# Patient Record
Sex: MALE | Race: WHITE | NOT HISPANIC OR LATINO | Employment: FULL TIME | URBAN - METROPOLITAN AREA
[De-identification: names, ages, dates, MRNs, and addresses within clinical notes are randomized per-mention and may not be internally consistent; named-entity substitution may affect disease eponyms.]

---

## 2017-03-21 ENCOUNTER — HOSPITAL ENCOUNTER (EMERGENCY)
Facility: MEDICAL CENTER | Age: 37
End: 2017-03-21
Attending: EMERGENCY MEDICINE
Payer: COMMERCIAL

## 2017-03-21 VITALS
RESPIRATION RATE: 18 BRPM | DIASTOLIC BLOOD PRESSURE: 77 MMHG | HEIGHT: 73 IN | HEART RATE: 82 BPM | OXYGEN SATURATION: 97 % | TEMPERATURE: 98.7 F | WEIGHT: 253.09 LBS | BODY MASS INDEX: 33.54 KG/M2 | SYSTOLIC BLOOD PRESSURE: 121 MMHG

## 2017-03-21 DIAGNOSIS — M79.641 RIGHT HAND PAIN: ICD-10-CM

## 2017-03-21 DIAGNOSIS — M10.9 ACUTE GOUTY ARTHRITIS: ICD-10-CM

## 2017-03-21 PROCEDURE — 99284 EMERGENCY DEPT VISIT MOD MDM: CPT

## 2017-03-21 PROCEDURE — 700102 HCHG RX REV CODE 250 W/ 637 OVERRIDE(OP): Performed by: EMERGENCY MEDICINE

## 2017-03-21 PROCEDURE — A9270 NON-COVERED ITEM OR SERVICE: HCPCS | Performed by: EMERGENCY MEDICINE

## 2017-03-21 RX ORDER — COLCHICINE 0.6 MG/1
0.6 TABLET ORAL ONCE
Status: COMPLETED | OUTPATIENT
Start: 2017-03-21 | End: 2017-03-21

## 2017-03-21 RX ORDER — METHYLPREDNISOLONE 4 MG/1
TABLET ORAL
Qty: 1 KIT | Refills: 0 | Status: SHIPPED | OUTPATIENT
Start: 2017-03-21 | End: 2017-08-22

## 2017-03-21 RX ORDER — COLCHICINE 0.6 MG/1
1.2 TABLET ORAL ONCE
Status: COMPLETED | OUTPATIENT
Start: 2017-03-21 | End: 2017-03-21

## 2017-03-21 RX ADMIN — COLCHICINE 0.6 MG: 0.6 TABLET, FILM COATED ORAL at 23:19

## 2017-03-21 RX ADMIN — COLCHICINE 1.2 MG: 0.6 TABLET, FILM COATED ORAL at 22:25

## 2017-03-21 ASSESSMENT — PAIN SCALES - GENERAL: PAINLEVEL_OUTOF10: 3

## 2017-03-21 NOTE — ED AVS SNAPSHOT
Home Care Instructions                                                                                                                Chase Hughes   MRN: [id number]    Department:  [facility], Emergency Dept   Date of Visit:  3/21/2017            [facility], Emergency Dept    [address]    [place] [address]    Phone:  [phone]      You were seen by     Amber Burr D.O.      Your Diagnosis Was     Right hand pain     M79.641       These are the medications you received during your hospitalization from 03/21/2017 2127 to 03/21/2017 2320     Date/Time Order Dose Route Action    03/21/2017 2225 colchicine (COLCRYS) tablet 1.2 mg 1.2 mg Oral Given    03/21/2017 2319 colchicine (COLCRYS) tablet 0.6 mg 0.6 mg Oral Given      Follow-up Information     1. Follow up with Anita Isabel M.D. In 1 day.    Specialty:  Family Medicine    Contact information    [address]  [address]  [facility] [address] [phone]          2. Follow up with Abbi Gan M.D. In 1 day.    Specialty:  Family Medicine    Contact information    [address]  [place] [address] [phone]          3. Follow up with [facility] In 1 day.    Contact information    [facility] [address] [phone]          4. Follow up with [facility] In 1 day.    Contact information    [address] [address] [phone]      Medication Information     Review all of your home medications and newly ordered medications with your primary doctor and/or pharmacist as soon as possible. Follow medication instructions as directed by your doctor and/or pharmacist.     Please keep your complete medication list with you and share with your physician. Update the information when medications are discontinued, doses are changed, or new medications (including over-the-counter products) are added; and carry medication information at all times in the event of  emergency situations.               Medication List      START taking these medications        Instructions    Morning Afternoon Evening Bedtime    MethylPREDNISolone 4 MG Tbpk   Commonly known as:  MEDROL DOSEPAK        Use as directed                             Where to Get Your Medications      You can get these medications from any pharmacy     Bring a paper prescription for each of these medications    - MethylPREDNISolone 4 MG Tbpk              Discharge Instructions       Follow-up with primary care 1-2 days for reevaluation, medication management and close blood pressure monitoring.    Medrol Dosepak as directed for 6 days.  Take naproxen twice daily as needed for pain or swelling.  Rest, ice, elevation as needed for discomfort.  Weightbearing and activity as tolerated.    Return to the emergency department in 24 hours for any persistent pain, swelling or redness.  Return to the emergency department immediately for increased pain, swelling, redness, fever or other new concerns.    Gout  Gout is an inflammatory arthritis caused by a buildup of uric acid crystals in the joints. Uric acid is a chemical that is normally present in the blood. When the level of uric acid in the blood is too high it can form crystals that deposit in your joints and tissues. This causes joint redness, soreness, and swelling (inflammation). Repeat attacks are common. Over time, uric acid crystals can form into masses (tophi) near a joint, destroying bone and causing disfigurement. Gout is treatable and often preventable.  CAUSES   The disease begins with elevated levels of uric acid in the blood. Uric acid is produced by your body when it breaks down a naturally found substance called purines. Certain foods you eat, such as meats and fish, contain high amounts of purines. Causes of an elevated uric acid level include:  · Being passed down from parent to child (heredity).  · Diseases that cause increased uric acid production (such as  obesity, psoriasis, and certain cancers).  · Excessive alcohol use.  · Diet, especially diets rich in meat and seafood.  · Medicines, including certain cancer-fighting medicines (chemotherapy), water pills (diuretics), and aspirin.  · Chronic kidney disease. The kidneys are no longer able to remove uric acid well.  · Problems with metabolism.  Conditions strongly associated with gout include:  · Obesity.  · High blood pressure.  · High cholesterol.  · Diabetes.  Not everyone with elevated uric acid levels gets gout. It is not understood why some people get gout and others do not. Surgery, joint injury, and eating too much of certain foods are some of the factors that can lead to gout attacks.  SYMPTOMS   · An attack of gout comes on quickly. It causes intense pain with redness, swelling, and warmth in a joint.  · Fever can occur.  · Often, only one joint is involved. Certain joints are more commonly involved:  ¨ Base of the big toe.  ¨ Knee.  ¨ Ankle.  ¨ Wrist.  ¨ Finger.  Without treatment, an attack usually goes away in a few days to weeks. Between attacks, you usually will not have symptoms, which is different from many other forms of arthritis.  DIAGNOSIS   Your caregiver will suspect gout based on your symptoms and exam. In some cases, tests may be recommended. The tests may include:  · Blood tests.  · Urine tests.  · X-rays.  · Joint fluid exam. This exam requires a needle to remove fluid from the joint (arthrocentesis). Using a microscope, gout is confirmed when uric acid crystals are seen in the joint fluid.  TREATMENT   There are two phases to gout treatment: treating the sudden onset (acute) attack and preventing attacks (prophylaxis).  · Treatment of an Acute Attack.  ¨ Medicines are used. These include anti-inflammatory medicines or steroid medicines.  ¨ An injection of steroid medicine into the affected joint is sometimes necessary.  ¨ The painful joint is rested. Movement can worsen the  arthritis.  ¨ You may use warm or cold treatments on painful joints, depending which works best for you.  · Treatment to Prevent Attacks.  ¨ If you suffer from frequent gout attacks, your caregiver may advise preventive medicine. These medicines are started after the acute attack subsides. These medicines either help your kidneys eliminate uric acid from your body or decrease your uric acid production. You may need to stay on these medicines for a very long time.  ¨ The early phase of treatment with preventive medicine can be associated with an increase in acute gout attacks. For this reason, during the first few months of treatment, your caregiver may also advise you to take medicines usually used for acute gout treatment. Be sure you understand your caregiver's directions. Your caregiver may make several adjustments to your medicine dose before these medicines are effective.  ¨ Discuss dietary treatment with your caregiver or dietitian. Alcohol and drinks high in sugar and fructose and foods such as meat, poultry, and seafood can increase uric acid levels. Your caregiver or dietitian can advise you on drinks and foods that should be limited.  HOME CARE INSTRUCTIONS   · Do not take aspirin to relieve pain. This raises uric acid levels.  · Only take over-the-counter or prescription medicines for pain, discomfort, or fever as directed by your caregiver.  · Rest the joint as much as possible. When in bed, keep sheets and blankets off painful areas.  · Keep the affected joint raised (elevated).  · Apply warm or cold treatments to painful joints. Use of warm or cold treatments depends on which works best for you.  · Use crutches if the painful joint is in your leg.  · Drink enough fluids to keep your urine clear or pale yellow. This helps your body get rid of uric acid. Limit alcohol, sugary drinks, and fructose drinks.  · Follow your dietary instructions. Pay careful attention to the amount of protein you eat. Your  daily diet should emphasize fruits, vegetables, whole grains, and fat-free or low-fat milk products. Discuss the use of coffee, vitamin C, and cherries with your caregiver or dietitian. These may be helpful in lowering uric acid levels.  · Maintain a healthy body weight.  SEEK MEDICAL CARE IF:   · You develop diarrhea, vomiting, or any side effects from medicines.  · You do not feel better in 24 hours, or you are getting worse.  SEEK IMMEDIATE MEDICAL CARE IF:   · Your joint becomes suddenly more tender, and you have chills or a fever.  MAKE SURE YOU:   · Understand these instructions.  · Will watch your condition.  · Will get help right away if you are not doing well or get worse.     This information is not intended to replace advice given to you by your health care provider. Make sure you discuss any questions you have with your health care provider.     Document Released: 12/15/2001 Document Revised: 01/08/2016 Document Reviewed: 07/31/2013  [employer] Interactive Patient Education ©2016 [employer]            Patient Information     Patient Information    Following emergency treatment: all patient requiring follow-up care must return either to a private physician or a clinic if your condition worsens before you are able to obtain further medical attention, please return to the emergency room.     Billing Information    At [facility], we work to make the billing process streamlined for our patients.  Our Representatives are here to answer any questions you may have regarding your hospital bill.  If you have insurance coverage and have supplied your insurance information to us, we will submit a claim to your insurer on your behalf.  Should you have any questions regarding your bill, we can be reached online or by phone as follows:  Online: You are able pay your bills online or live chat with our representatives about any billing questions you may have. We are here to help Monday - Friday from 8:00am to 7:30pm  and 9:00am - 12:00pm on Saturdays.  Please visit [email or web address]  for more information.   Phone:  [phone] or [phone]    Please note that your emergency physician, surgeon, pathologist, radiologist, anesthesiologist, and other specialists are not employed by [facility] and will therefore bill separately for their services.  Please contact them directly for any questions concerning their bills at the numbers below:     Emergency Physician Services:  [phone]  [facility]:  [phone]  [facility]:  [phone]  [facility]:  [phone]    1. Your final bill may vary from the amount quoted upon discharge if all procedures are not complete at that time, or if your doctor has additional procedures of which we are not aware. You will receive an additional bill if you return to the Emergency Department at [facility] for suture removal regardless of the facility of which the sutures were placed.     2. Please arrange for settlement of this account at the emergency registration.    3. All self-pay accounts are due in full at the time of treatment.  If you are unable to meet this obligation then payment is expected within 4-5 days.     4. If you have had radiology studies (CT, X-ray, Ultrasound, MRI), you have received a preliminary result during your emergency department visit. Please contact the radiology department [phone] to receive a copy of your final result. Please discuss the Final result with your primary physician or with the follow up physician provided.     Crisis Hotline:  [place] Crisis Hotline:  3-642-HLVSHGL or 1-285.269.6822  [place] Crisis Hotline:    1-207.828.1115 or 960-857-1039         ED Discharge Follow Up Questions    1. In order to provide you with very good care, we would like to follow up with a phone call in the next few days.  May we have your permission to contact you?     YES /   NO    2. What is the best phone number to call you? (       )_____-__________    3. What is the best time to call you?      Morning  /  Afternoon  /  Evening                   Patient Signature:  ____________________________________________________________    Date:  ____________________________________________________________

## 2017-03-21 NOTE — ED AVS SNAPSHOT
[employer] Access Code: [email or web address]  Expires: 4/20/2017 11:20 PM    Your email address is not on file at [facility].  Email Addresses are required for you to sign up for [employer], please contact [phone] to verify your personal information and to provide your email address prior to attempting to register for [employer].    Chase Hughes  [address] [place] [address]    [employer]  A secure, online tool to manage your health information     [facility]’s [employer]® is a secure, online tool that connects you to your personalized health information from the privacy of your home -- day or night - making it very easy for you to manage your healthcare. Once the activation process is completed, you can even access your medical information using the [employer] nahum, which is available for free in the Apple Nahum store or Google Play store.     To learn more about [employer], visit [email or web address] Systems    There are two levels of access available (as shown below):   My Chart Features  [facility] Primary Care Doctor [facility]  Specialists [facility]  Urgent  Care Non-[facility] Primary Care Doctor   Email your healthcare team securely and privately 24/7 X X X    Manage appointments: schedule your next appointment; view details of past/upcoming appointments X      Request prescription refills. X      View recent personal medical records, including lab and immunizations X X X X   View health record, including health history, allergies, medications X X X X   Read reports about your outpatient visits, procedures, consult and ER notes X X X X   See your discharge summary, which is a recap of your hospital and/or ER visit that includes your diagnosis, lab results, and care plan X X  X     How to register for [employer]:  Once your e-mail address has been verified, follow the following steps to sign up for [employer].     1. Go to  [email or web address]  2. Click on the Sign Up Now box, which takes you to the New Member Sign Up page. You will need  to provide the following information:  a. Enter your [employer] Access Code exactly as it appears at the top of this page. (You will not need to use this code after you’ve completed the sign-up process. If you do not sign up before the expiration date, you must request a new code.)   b. Enter your date of birth.   c. Enter your home email address.   d. Click Submit, and follow the next screen’s instructions.  3. Create a Symptify ID. This will be your [employer] login ID and cannot be changed, so think of one that is secure and easy to remember.  4. Create a [employer] password. You can change your password at any time.  5. Enter your Password Reset Question and Answer. This can be used at a later time if you forget your password.   6. Enter your e-mail address. This allows you to receive e-mail notifications when new information is available in [employer].  7. Click Sign Up. You can now view your health information.    For assistance activating your [employer] account, call [phone]

## 2017-03-21 NOTE — ED AVS SNAPSHOT
3/21/2017          Chase Hughes  [address] [place] [address]    Dear Chase:    [facility] wants to ensure your discharge home is safe and you or your loved ones have had all your questions answered regarding your care after you leave the hospital.    You may receive a telephone call within two days of your discharge.  This call is to make certain you understand your discharge instructions as well as ensure we provided you with the best care possible during your stay with us.     The call will only last approximately 3-5 minutes and will be done by a nurse.    Once again, we want to ensure your discharge home is safe and that you have a clear understanding of any next steps in your care.  If you have any questions or concerns, please do not hesitate to contact us, we are here for you.  Thank you for choosing [facility] for your healthcare needs.    Sincerely,    Cosmo Seaman    [facility]

## 2017-03-22 NOTE — ED NOTES
"Chief Complaint   Patient presents with   • Gout     pt has hx of gout and states he is having a flare up in right hand        /89 mmHg  Pulse 86  Temp(Src) 37.1 °C (98.7 °F)  Resp 18  Ht 1.854 m (6' 0.99\")  Wt 114.8 kg (253 lb 1.4 oz)  BMI 33.40 kg/m2  SpO2 96%    "

## 2017-03-22 NOTE — ED PROVIDER NOTES
"ED Provider Note    CHIEF COMPLAINT  Chief Complaint   Patient presents with   • Gout     pt has hx of gout and states he is having a flare up in right hand        HPI  Chase Hughes is a 36 y.o. male who presents to the emergency department complaining of a \"gout\" flareup. Patient describes progressive pain and swelling at the lateral aspect of his right hand progressive over the last 2 days. Mild erythema and warmth. Pain with palpation and movement. Denies fever or chills. Denies nausea or vomiting. Denies trauma, injury. Patient describes history of similar symptoms previously at left lower extremity, great toe and knee. Previous diagnosis of gout per patient, not taking any allopurinol, indomethacin makes him sick, but he does use naproxen as needed and states that Medrol Dosepaks normally help his symptoms tremendously.    REVIEW OF SYSTEMS  See HPI for further details. All other systems are negative.     PAST MEDICAL HISTORY   has a past medical history of Gout, unspecified.    SOCIAL HISTORY  Social History     Social History Main Topics   • Smoking status: Former Smoker   • Smokeless tobacco: Not on file      Comment: 1/2 pack a day.   • Alcohol Use: No      Comment: 3 PER MONTH   • Drug Use: No   • Sexual Activity: Not on file       SURGICAL HISTORY   has past surgical history that includes other abdominal surgery; other abdominal surgery (1997); and ankle arthroscopy (5/19/2010).    CURRENT MEDICATIONS  Home Medications     Reviewed by Ambrose Mckay R.N. (Registered Nurse) on 03/21/17 at 2150  Med List Status: Complete    Medication Last Dose Status          Patient Dante Taking any Medications                        ALLERGIES  No Known Allergies    PHYSICAL EXAM  VITAL SIGNS: /89 mmHg  Pulse 86  Temp(Src) 37.1 °C (98.7 °F)  Resp 18  Ht 1.854 m (6' 0.99\")  Wt 114.8 kg (253 lb 1.4 oz)  BMI 33.40 kg/m2  SpO2 96%  Pulse ox interpretation: I interpret this pulse ox as " normal.  Constitutional: Alert in no apparent distress.  HENT: Normocephalic, atraumatic. Bilateral external ears normal, Nose normal. Moist mucous membranes.    Eyes: Pupils are equal and reactive, Conjunctiva normal.   Neck: Normal range of motion, Supple.  Cardiovascular: Normal peripheral perfusion.  Thorax & Lungs: Nonlabored respirations.  Skin: Warm, Dry.  Musculoskeletal: Mild induration overlying the length of the lateral 5th metacarpal. Mild erythema and warmth. No fluctuance, no crepitus. Full range of motion with mild discomfort. 2+ radial pulse, less than 2 2nd capillary refill. Sensation intact to light touch.  Neurologic: Alert , no gross focal deficit noted.  Psychiatric: Affect normal, Judgment normal, Mood normal.       COURSE & MEDICAL DECISION MAKING  Nursing notes and vital signs were reviewed. (See chart for details)  The patients records were reviewed, history was obtained from the patient;     ED evaluation could be consistent with acute gouty arthritis given patient's history and reported similar symptoms. Improvement notable in just one hour following 1.2 mg of colchicine. A 2nd dose of 0.6 mg was given before discharge. Patient was discharged with Medrol Dosepak as requested, and encouraged to take naproxen twice daily (he already has this at home) this week as well. He is aware that my concern for other soft tissue infection persists, but declines any further workup given his persistence of a previous similar presentation with gout. No clinical evidence for abscess or crepitus to suggest necrotizing fasciitis. No history for trauma to suggest underlying fracture. CMS and full range of motion intact distally. Vital signs are stable without fever or tachycardia.    Patient is stable for discharge at this time, anticipatory guidance provided, close follow-up is encouraged with primary care (he has also been referred to other primary care options to ensure follow-up), and strict ED return  instructions have been detailed and include 24-hour return for persistent or unchanged symptoms and immediate return for worsening symptoms or fever at which time he states he would be agreeable for further workup. Patient is agreeable to the disposition and plan.    Patient's blood pressure was elevated in the emergency department, and has been referred to primary care for close monitoring.    FINAL IMPRESSION  (M79.641) Right hand pain  (M10.9) Acute gouty arthritis      Electronically signed by: Amber Burr, 3/21/2017 10:13 PM      This dictation was created using voice recognition software. The accuracy of the dictation is limited to the abilities of the software. I expect there may be some errors of grammar and possibly content. The nursing notes were reviewed and certain aspects of this information were incorporated into this note.

## 2017-03-22 NOTE — DISCHARGE INSTRUCTIONS
Follow-up with primary care 1-2 days for reevaluation, medication management and close blood pressure monitoring.    Medrol Dosepak as directed for 6 days.  Take naproxen twice daily as needed for pain or swelling.  Rest, ice, elevation as needed for discomfort.  Weightbearing and activity as tolerated.    Return to the emergency department in 24 hours for any persistent pain, swelling or redness.  Return to the emergency department immediately for increased pain, swelling, redness, fever or other new concerns.    Gout  Gout is an inflammatory arthritis caused by a buildup of uric acid crystals in the joints. Uric acid is a chemical that is normally present in the blood. When the level of uric acid in the blood is too high it can form crystals that deposit in your joints and tissues. This causes joint redness, soreness, and swelling (inflammation). Repeat attacks are common. Over time, uric acid crystals can form into masses (tophi) near a joint, destroying bone and causing disfigurement. Gout is treatable and often preventable.  CAUSES   The disease begins with elevated levels of uric acid in the blood. Uric acid is produced by your body when it breaks down a naturally found substance called purines. Certain foods you eat, such as meats and fish, contain high amounts of purines. Causes of an elevated uric acid level include:  · Being passed down from parent to child (heredity).  · Diseases that cause increased uric acid production (such as obesity, psoriasis, and certain cancers).  · Excessive alcohol use.  · Diet, especially diets rich in meat and seafood.  · Medicines, including certain cancer-fighting medicines (chemotherapy), water pills (diuretics), and aspirin.  · Chronic kidney disease. The kidneys are no longer able to remove uric acid well.  · Problems with metabolism.  Conditions strongly associated with gout include:  · Obesity.  · High blood pressure.  · High cholesterol.  · Diabetes.  Not everyone with  elevated uric acid levels gets gout. It is not understood why some people get gout and others do not. Surgery, joint injury, and eating too much of certain foods are some of the factors that can lead to gout attacks.  SYMPTOMS   · An attack of gout comes on quickly. It causes intense pain with redness, swelling, and warmth in a joint.  · Fever can occur.  · Often, only one joint is involved. Certain joints are more commonly involved:  ¨ Base of the big toe.  ¨ Knee.  ¨ Ankle.  ¨ Wrist.  ¨ Finger.  Without treatment, an attack usually goes away in a few days to weeks. Between attacks, you usually will not have symptoms, which is different from many other forms of arthritis.  DIAGNOSIS   Your caregiver will suspect gout based on your symptoms and exam. In some cases, tests may be recommended. The tests may include:  · Blood tests.  · Urine tests.  · X-rays.  · Joint fluid exam. This exam requires a needle to remove fluid from the joint (arthrocentesis). Using a microscope, gout is confirmed when uric acid crystals are seen in the joint fluid.  TREATMENT   There are two phases to gout treatment: treating the sudden onset (acute) attack and preventing attacks (prophylaxis).  · Treatment of an Acute Attack.  ¨ Medicines are used. These include anti-inflammatory medicines or steroid medicines.  ¨ An injection of steroid medicine into the affected joint is sometimes necessary.  ¨ The painful joint is rested. Movement can worsen the arthritis.  ¨ You may use warm or cold treatments on painful joints, depending which works best for you.  · Treatment to Prevent Attacks.  ¨ If you suffer from frequent gout attacks, your caregiver may advise preventive medicine. These medicines are started after the acute attack subsides. These medicines either help your kidneys eliminate uric acid from your body or decrease your uric acid production. You may need to stay on these medicines for a very long time.  ¨ The early phase of treatment  with preventive medicine can be associated with an increase in acute gout attacks. For this reason, during the first few months of treatment, your caregiver may also advise you to take medicines usually used for acute gout treatment. Be sure you understand your caregiver's directions. Your caregiver may make several adjustments to your medicine dose before these medicines are effective.  ¨ Discuss dietary treatment with your caregiver or dietitian. Alcohol and drinks high in sugar and fructose and foods such as meat, poultry, and seafood can increase uric acid levels. Your caregiver or dietitian can advise you on drinks and foods that should be limited.  HOME CARE INSTRUCTIONS   · Do not take aspirin to relieve pain. This raises uric acid levels.  · Only take over-the-counter or prescription medicines for pain, discomfort, or fever as directed by your caregiver.  · Rest the joint as much as possible. When in bed, keep sheets and blankets off painful areas.  · Keep the affected joint raised (elevated).  · Apply warm or cold treatments to painful joints. Use of warm or cold treatments depends on which works best for you.  · Use crutches if the painful joint is in your leg.  · Drink enough fluids to keep your urine clear or pale yellow. This helps your body get rid of uric acid. Limit alcohol, sugary drinks, and fructose drinks.  · Follow your dietary instructions. Pay careful attention to the amount of protein you eat. Your daily diet should emphasize fruits, vegetables, whole grains, and fat-free or low-fat milk products. Discuss the use of coffee, vitamin C, and cherries with your caregiver or dietitian. These may be helpful in lowering uric acid levels.  · Maintain a healthy body weight.  SEEK MEDICAL CARE IF:   · You develop diarrhea, vomiting, or any side effects from medicines.  · You do not feel better in 24 hours, or you are getting worse.  SEEK IMMEDIATE MEDICAL CARE IF:   · Your joint becomes suddenly more  tender, and you have chills or a fever.  MAKE SURE YOU:   · Understand these instructions.  · Will watch your condition.  · Will get help right away if you are not doing well or get worse.     This information is not intended to replace advice given to you by your health care provider. Make sure you discuss any questions you have with your health care provider.     Document Released: 12/15/2001 Document Revised: 01/08/2016 Document Reviewed: 07/31/2013  [employer] Interactive Patient Education ©2016 [employer]

## 2017-08-22 ENCOUNTER — OFFICE VISIT (OUTPATIENT)
Dept: URGENT CARE | Facility: PHYSICIAN GROUP | Age: 37
End: 2017-08-22
Payer: COMMERCIAL

## 2017-08-22 VITALS
WEIGHT: 228 LBS | HEIGHT: 72 IN | OXYGEN SATURATION: 97 % | BODY MASS INDEX: 30.88 KG/M2 | RESPIRATION RATE: 14 BRPM | DIASTOLIC BLOOD PRESSURE: 70 MMHG | SYSTOLIC BLOOD PRESSURE: 104 MMHG | HEART RATE: 98 BPM | TEMPERATURE: 99.3 F

## 2017-08-22 DIAGNOSIS — E66.9 OBESITY (BMI 30-39.9): ICD-10-CM

## 2017-08-22 DIAGNOSIS — M10.022 ACUTE IDIOPATHIC GOUT OF LEFT ELBOW: ICD-10-CM

## 2017-08-22 PROCEDURE — 99214 OFFICE O/P EST MOD 30 MIN: CPT | Performed by: FAMILY MEDICINE

## 2017-08-22 RX ORDER — ALLOPURINOL 100 MG/1
TABLET ORAL
Qty: 30 TAB | Refills: 1 | Status: SHIPPED | OUTPATIENT
Start: 2017-08-22 | End: 2019-08-15

## 2017-08-22 RX ORDER — COLCHICINE 0.6 MG/1
TABLET ORAL
Qty: 15 TAB | Refills: 0 | Status: SHIPPED | OUTPATIENT
Start: 2017-08-22 | End: 2019-08-15

## 2017-08-22 RX ORDER — METHYLPREDNISOLONE 4 MG/1
TABLET ORAL
Qty: 21 TAB | Refills: 0 | Status: SHIPPED | OUTPATIENT
Start: 2017-08-22 | End: 2019-08-15

## 2017-08-22 ASSESSMENT — PAIN SCALES - GENERAL: PAINLEVEL: 8=MODERATE-SEVERE PAIN

## 2017-08-22 NOTE — PROGRESS NOTES
Chief Complaint:    Chief Complaint   Patient presents with   • Gout     Left Elbow x2 days       History of Present Illness:    This is a new problem. Has pain and swelling in left elbow, olecranon region that started yesterday. He is certain is gout attack due to his recent pizza and beer consumption. No injury or trauma. Medrol Dose North and Colchicine have been helpful for previous gout attacks. Did not tolerate Indocin in past. Has heard good things about Allopurinol preventing gout flares and wants to start med. Asking for Rx with 1 RF till he can get into see his PCP.      Review of Systems:    Constitutional: Negative for fever, chills, and diaphoresis.   Eyes: Negative for change in vision, photophobia, pain, redness, and discharge.  ENT: Negative for ear pain, ear discharge, hearing loss, tinnitus, nasal congestion, nosebleeds, and sore throat.    Respiratory: Negative for cough, hemoptysis, sputum production, shortness of breath, wheezing, and stridor.    Cardiovascular: Negative for chest pain, palpitations, orthopnea, claudication, leg swelling, and PND.   Gastrointestinal: Negative for abdominal pain, nausea, vomiting, diarrhea, constipation, blood in stool, and melena.   Genitourinary: Negative for dysuria, urinary urgency, urinary frequency, hematuria, and flank pain.   Musculoskeletal: See HPI.  Skin: Negative for rash and itching.   Neurological: Negative for dizziness, tingling, tremors, sensory change, speech change, focal weakness, seizures, loss of consciousness, and headaches.   Endo: Negative for polydipsia.   Heme: Does not bruise/bleed easily.   Psychiatric/Behavioral: Negative for depression, suicidal ideas, hallucinations, memory loss and substance abuse. The patient is not nervous/anxious and does not have insomnia.      Past Medical History:    Past Medical History   Diagnosis Date   • Gout, unspecified        Past Surgical History:    Past Surgical History   Procedure Laterality Date    • Other abdominal surgery       spleenectomy 1997   • Other abdominal surgery  1997     COLOSTOMY AND REVERSAL   • Ankle arthroscopy  5/19/2010     Performed by IRMA MELISSA at SURGERY SAME DAY [facility]       Social History:    Social History     Social History   • Marital Status:      Spouse Name: N/A   • Number of Children: N/A   • Years of Education: N/A     Occupational History   • Not on file.     Social History Main Topics   • Smoking status: Former Smoker   • Smokeless tobacco: Never Used      Comment: 1/2 pack a day.   • Alcohol Use: No      Comment: 3 PER MONTH   • Drug Use: No   • Sexual Activity: Not on file     Other Topics Concern   • Not on file     Social History Narrative       Family History:    History reviewed. No pertinent family history.    Medications:    No current outpatient prescriptions on file prior to visit.     No current facility-administered medications on file prior to visit.       Allergies:    No Known Allergies      Vitals:    Filed Vitals:    08/22/17 0932   BP: 104/70   Pulse: 98   Temp: 37.4 °C (99.3 °F)   Resp: 14   Height: 1.829 m (6')   Weight: 103.42 kg (228 lb)   SpO2: 97%       Physical Exam:    Constitutional: Vital signs reviewed. Appears well-developed and well-nourished. No acute distress.   Eyes: Sclera white, conjunctivae clear.  ENT: External ears normal. Hearing normal.  Cardiovascular: Peripheral pulses 2+.   Pulmonary/Chest: Respirations non-labored.  Musculoskeletal: Left elbow: swollen and tender to palpation in olecranon region. Left elbow with severely decreased range of motion due to pain in extensor region. Normal gait. No muscular atrophy or weakness.  Neurological: Alert and oriented to person, place, and time. Muscle tone normal. Coordination normal. Light touch and sensation normal.   Skin: No rashes or lesions. Warm, dry, normal turgor.  Psychiatric: Normal mood and affect. Behavior is normal. Judgment and thought content normal.        Assessment / Plan:    1. Acute idiopathic gout of left elbow  - MethylPREDNISolone (MEDROL DOSEPAK) 4 MG Tablet Therapy Pack; TAKE AS DIRECTED ON PACKAGE.  Dispense: 21 Tab; Refill: 0  - colchicine (COLCRYS) 0.6 MG Tab; TAKE 2 TABS AT SIGNS OF GOUT ATTACK, THEN 1 TAB ONE HOUR AFTER FIRST DOSE. DO NOT REPEAT EARLIER THAN 3 DAYS.  Dispense: 15 Tab; Refill: 0  - allopurinol (ZYLOPRIM) 100 MG Tab; 1 TAB ONCE A DAY TO PREVENT GOUT ATTACK. START AFTER CURRENT ATTACK IS OVER.  Dispense: 30 Tab; Refill: 1    2. Obesity (BMI 30-39.9)  - Patient identified as having weight management issue.  Appropriate orders and counseling given.      Discussed with him DDX and management options.    Agreeable to medications prescribed.    Follow-up with PCP or urgent care if getting worse or not better with above.

## 2017-08-22 NOTE — MR AVS SNAPSHOT
Chase Hughes   2017 9:10 AM   Office Visit   MRN: [id number]    Department:  [facility]   Dept Phone:  [phone]    Description:  Male : 1980   Provider:  Roe Wu M.D.           Reason for Visit     Gout Left Elbow x2 days      Allergies as of 2017     No Known Allergies      You were diagnosed with     Acute idiopathic gout of left elbow   [[id number]]         Vital Signs     Blood Pressure Pulse Temperature Respirations Height Weight    104/70 mmHg 98 37.4 °C (99.3 °F) 14 1.829 m (6') 103.42 kg (228 lb)    Body Mass Index Oxygen Saturation Smoking Status             30.92 kg/m2 97% Former Smoker         Basic Information     Date Of Birth Sex Race Ethnicity Preferred Language    1980 Male White Non- English      Health Maintenance        Date Due Completion Dates    IMM DTaP/Tdap/Td Vaccine (1 - Tdap) 2000 ---    IMM INFLUENZA (1) 2017 ---            Current Immunizations     No immunizations on file.      Below and/or attached are the medications your provider expects you to take. Review all of your home medications and newly ordered medications with your provider and/or pharmacist. Follow medication instructions as directed by your provider and/or pharmacist. Please keep your medication list with you and share with your provider. Update the information when medications are discontinued, doses are changed, or new medications (including over-the-counter products) are added; and carry medication information at all times in the event of emergency situations     Allergies:  No Known Allergies          Medications  Valid as of: 2017 -  9:42 AM    Generic Name Brand Name Tablet Size Instructions for use    Allopurinol (Tab) ZYLOPRIM 100 MG 1 TAB ONCE A DAY TO PREVENT GOUT ATTACK. START AFTER CURRENT ATTACK IS OVER.        Colchicine (Tab) COLCRYS 0.6 MG TAKE 2 TABS AT SIGNS OF GOUT ATTACK, THEN 1 TAB ONE HOUR AFTER FIRST DOSE. DO NOT REPEAT  EARLIER THAN 3 DAYS.        MethylPREDNISolone (Tablet Therapy Pack) MEDROL DOSEPAK 4 MG TAKE AS DIRECTED ON PACKAGE.        .                 Medicines prescribed today were sent to:     [id number] - [place] [address]    Phone: [phone] Fax: [phone]    Open 24 Hours?: No      Medication refill instructions:       If your prescription bottle indicates you have medication refills left, it is not necessary to call your provider’s office. Please contact your pharmacy and they will refill your medication.    If your prescription bottle indicates you do not have any refills left, you may request refills at any time through one of the following ways: The online Spool system (except Urgent Care), by calling your provider’s office, or by asking your pharmacy to contact your provider’s office with a refill request. Medication refills are processed only during regular business hours and may not be available until the next business day. Your provider may request additional information or to have a follow-up visit with you prior to refilling your medication.   *Please Note: Medication refills are assigned a new Rx number when refilled electronically. Your pharmacy may indicate that no refills were authorized even though a new prescription for the same medication is available at the pharmacy. Please request the medicine by name with the pharmacy before contacting your provider for a refill.           Spool Access Code: [email or web address]  Expires: 8/27/2017  4:10 AM    Spool  A secure, online tool to manage your health information     [employer] Spool® is a secure, online tool that connects you to your personalized health information from the privacy of your home -- day or night - making it very easy for you to manage your healthcare. Once the activation process is completed, you can even access your medical information using the Spool stacey, which is available  for free in the Apple Nahum store or Google Play store.     [email or web address] provides the following levels of access (as shown below):   My Chart Features   [facility] Doctor [facility]  Specialists [facility]  Urgent  Care Non-[facility]  Primary Care  Doctor   Email your healthcare team securely and privately 24/7 X X X    Manage appointments: schedule your next appointment; view details of past/upcoming appointments X      Request prescription refills. X      View recent personal medical records, including lab and immunizations X X X X   View health record, including health history, allergies, medications X X X X   Read reports about your outpatient visits, procedures, consult and ER notes X X X X   See your discharge summary, which is a recap of your hospital and/or ER visit that includes your diagnosis, lab results, and care plan. X X       How to register for [email or web address]:  1. Go to  [email or web address].  2. Click on the Sign Up Now box, which takes you to the New Member Sign Up page. You will need to provide the following information:  a. Enter your [email or web address] Access Code exactly as it appears at the top of this page. (You will not need to use this code after you’ve completed the sign-up process. If you do not sign up before the expiration date, you must request a new code.)   b. Enter your date of birth.   c. Enter your home email address.   d. Click Submit, and follow the next screen’s instructions.  3. Create a [email or web address] ID. This will be your [email or web address] login ID and cannot be changed, so think of one that is secure and easy to remember.  4. Create a [email or web address] password. You can change your password at any time.  5. Enter your Password Reset Question and Answer. This can be used at a later time if you forget your password.   6. Enter your e-mail address. This allows you to receive e-mail notifications when new information is available in [email or web address].  7. Click Sign Up. You can now view your health information.    For assistance  activating your [employer]t account, call [phone]

## 2017-11-15 ENCOUNTER — OFFICE VISIT (OUTPATIENT)
Dept: URGENT CARE | Facility: PHYSICIAN GROUP | Age: 37
End: 2017-11-15
Payer: COMMERCIAL

## 2017-11-15 VITALS
BODY MASS INDEX: 31.83 KG/M2 | HEIGHT: 72 IN | HEART RATE: 82 BPM | SYSTOLIC BLOOD PRESSURE: 122 MMHG | WEIGHT: 235 LBS | RESPIRATION RATE: 14 BRPM | TEMPERATURE: 98.6 F | OXYGEN SATURATION: 98 % | DIASTOLIC BLOOD PRESSURE: 78 MMHG

## 2017-11-15 DIAGNOSIS — M10.00 ACUTE IDIOPATHIC GOUT, UNSPECIFIED SITE: ICD-10-CM

## 2017-11-15 PROCEDURE — 99214 OFFICE O/P EST MOD 30 MIN: CPT | Performed by: NURSE PRACTITIONER

## 2017-11-15 RX ORDER — METHYLPREDNISOLONE 4 MG/1
TABLET ORAL
Qty: 1 KIT | Refills: 0 | Status: SHIPPED | OUTPATIENT
Start: 2017-11-15 | End: 2019-08-15

## 2017-11-15 RX ORDER — COLCHICINE 0.6 MG/1
TABLET ORAL
Qty: 3 TAB | Refills: 0 | Status: SHIPPED | OUTPATIENT
Start: 2017-11-15 | End: 2019-08-15

## 2017-11-15 RX ORDER — ALLOPURINOL 100 MG/1
100 TABLET ORAL DAILY
Qty: 30 TAB | Refills: 2 | Status: SHIPPED | OUTPATIENT
Start: 2017-11-15 | End: 2019-08-15

## 2017-11-15 ASSESSMENT — ENCOUNTER SYMPTOMS
NAUSEA: 0
DIZZINESS: 0
HEADACHES: 0
CHILLS: 0
COUGH: 0
VOMITING: 0
FEVER: 0
MYALGIAS: 1

## 2017-11-15 NOTE — LETTER
November 15, 2017        Chase Hughes  [address] [address]        Chase was seen in our clinic today and he is excused from work for today.  If you have any questions or concerns, please don't hesitate to call.        Sincerely,        NICOLLE Styles.P.SAVANNAH.    Electronically Signed

## 2017-11-15 NOTE — PROGRESS NOTES
Subjective:      Chase Hughes is a 37 y.o. male who presents with Gout (poss gout L foot)            HPI This is a new problem. 37 year old with pain in dorsum of left foot since 2 days ago. He states his pain is good 6/10, constant and worse with ambulation. He does have history of gout but this is improved when he is on allopurinol (he requests refill of this as he is out). He has taken 2 aleve last night with minimal relief.  Patient has no known allergies.  Current Outpatient Prescriptions on File Prior to Visit   Medication Sig Dispense Refill   • MethylPREDNISolone (MEDROL DOSEPAK) 4 MG Tablet Therapy Pack TAKE AS DIRECTED ON PACKAGE. 21 Tab 0   • colchicine (COLCRYS) 0.6 MG Tab TAKE 2 TABS AT SIGNS OF GOUT ATTACK, THEN 1 TAB ONE HOUR AFTER FIRST DOSE. DO NOT REPEAT EARLIER THAN 3 DAYS. 15 Tab 0   • allopurinol (ZYLOPRIM) 100 MG Tab 1 TAB ONCE A DAY TO PREVENT GOUT ATTACK. START AFTER CURRENT ATTACK IS OVER. 30 Tab 1     No current facility-administered medications on file prior to visit.      Social History     Social History   • Marital status:      Spouse name: N/A   • Number of children: N/A   • Years of education: N/A     Occupational History   • Not on file.     Social History Main Topics   • Smoking status: Former Smoker   • Smokeless tobacco: Never Used      Comment: 1/2 pack a day.   • Alcohol use No      Comment: 3 PER MONTH   • Drug use: No   • Sexual activity: Not on file     Other Topics Concern   • Not on file     Social History Narrative   • No narrative on file     family history is not on file.        Review of Systems   Constitutional: Negative for chills, fever and malaise/fatigue.   Respiratory: Negative for cough.    Gastrointestinal: Negative for nausea and vomiting.   Musculoskeletal: Positive for joint pain and myalgias.   Skin: Negative for itching and rash.        Erythema of dorsum of left foot.   Neurological: Negative for dizziness and headaches.          Objective:      /78   Pulse 82   Temp 37 °C (98.6 °F)   Resp 14   Ht 1.829 m (6')   Wt 106.6 kg (235 lb)   SpO2 98%   BMI 31.87 kg/m²      Physical Exam   Constitutional: He is oriented to person, place, and time. He appears well-developed and well-nourished. No distress.   Cardiovascular: Normal rate, regular rhythm and normal heart sounds.    No murmur heard.  Pulmonary/Chest: Effort normal and breath sounds normal.   Musculoskeletal: Normal range of motion.        Left foot: There is tenderness and bony tenderness. There is no swelling.        Feet:    Neurological: He is alert and oriented to person, place, and time.   Skin: Skin is warm and dry. There is erythema.   Nursing note and vitals reviewed.              Assessment/Plan:     1. Acute idiopathic gout, unspecified site  allopurinol (ZYLOPRIM) 100 MG Tab    colchicine (COLCRYS) 0.6 MG Tab    MethylPREDNISolone (MEDROL DOSEPAK) 4 MG Tablet Therapy Pack     Refill of allopurinol.  Declines flu shot at this time.  Differential diagnosis, natural history, supportive care, and indications for immediate follow-up discussed at length.

## 2018-02-24 ENCOUNTER — OFFICE VISIT (OUTPATIENT)
Dept: URGENT CARE | Facility: PHYSICIAN GROUP | Age: 38
End: 2018-02-24
Payer: COMMERCIAL

## 2018-02-24 VITALS
SYSTOLIC BLOOD PRESSURE: 146 MMHG | DIASTOLIC BLOOD PRESSURE: 94 MMHG | BODY MASS INDEX: 31.81 KG/M2 | WEIGHT: 240 LBS | OXYGEN SATURATION: 100 % | HEIGHT: 73 IN | TEMPERATURE: 99.5 F | HEART RATE: 96 BPM | RESPIRATION RATE: 15 BRPM

## 2018-02-24 DIAGNOSIS — Z87.39 HISTORY OF GOUT: ICD-10-CM

## 2018-02-24 DIAGNOSIS — M10.9 ACUTE GOUT OF RIGHT ANKLE, UNSPECIFIED CAUSE: ICD-10-CM

## 2018-02-24 PROCEDURE — 99214 OFFICE O/P EST MOD 30 MIN: CPT | Performed by: PHYSICIAN ASSISTANT

## 2018-02-24 RX ORDER — METHYLPREDNISOLONE 4 MG/1
TABLET ORAL
Qty: 1 KIT | Refills: 0 | Status: SHIPPED | OUTPATIENT
Start: 2018-02-24 | End: 2019-08-15

## 2018-02-24 RX ORDER — COLCHICINE 0.6 MG/1
TABLET ORAL
Qty: 9 TAB | Refills: 0 | Status: SHIPPED | OUTPATIENT
Start: 2018-02-24 | End: 2019-08-15

## 2018-02-24 ASSESSMENT — PAIN SCALES - GENERAL: PAINLEVEL: 6=MODERATE PAIN

## 2018-02-28 ASSESSMENT — ENCOUNTER SYMPTOMS
INABILITY TO BEAR WEIGHT: 0
DIARRHEA: 0
EYE DISCHARGE: 0
FEVER: 0
TINGLING: 0
LOSS OF MOTION: 0
HEADACHES: 0
NUMBNESS: 0
NECK PAIN: 0
COUGH: 0
CHILLS: 0
MYALGIAS: 0
EYE REDNESS: 0
SORE THROAT: 0
SPUTUM PRODUCTION: 0
WHEEZING: 0
FALLS: 0
VOMITING: 0
ABDOMINAL PAIN: 0
DIZZINESS: 0

## 2018-02-28 NOTE — PROGRESS NOTES
" Subjective:      Chase Hughes is a 37 y.o. male who presents with Gout (right ankle onset yesterday)            Patient's a 37-year-old male who presents today with right ankle, pain and swelling for the last 2 days. Patient reports history of same with prior gout flares. He does report that his triggers appears to be pork of which he at 2 nights ago.   He denies any injury or trauma. Has been persistent in taking his allopurinol which he's been on for over a year. Patient has been stable with diet changes at this time. Of note patient denies recent right eye and discharge or unilateral joint swelling, or urinary symptoms, to include dysuria or penile discharge. Denies history of diabetes.      Ankle Pain    The incident occurred 2 days ago. The incident occurred at home. There was no injury mechanism. The pain is at a severity of 6/10. The pain is moderate. The pain has been constant since onset. Pertinent negatives include no inability to bear weight, loss of motion, numbness or tingling. He reports no foreign bodies present. The symptoms are aggravated by movement, palpation and weight bearing. He has tried ice for the symptoms. The treatment provided mild relief.       Review of Systems   Constitutional: Negative for chills, fever and malaise/fatigue.   HENT: Negative for congestion, ear discharge, ear pain and sore throat.    Eyes: Negative for discharge and redness.   Respiratory: Negative for cough, sputum production and wheezing.    Gastrointestinal: Negative for abdominal pain, diarrhea and vomiting.   Genitourinary: Negative for dysuria and urgency.   Musculoskeletal: Positive for joint pain. Negative for falls, myalgias and neck pain.   Skin: Negative for itching and rash.   Neurological: Negative for dizziness, tingling, numbness and headaches.   All other systems reviewed and are negative.         Objective:     /94   Pulse 96   Temp 37.5 °C (99.5 °F)   Resp 15   Ht 1.854 m (6' 1\")   Wt " 108.9 kg (240 lb)   SpO2 100%   BMI 31.66 kg/m²    PMH:  has a past medical history of Gout, unspecified.  MEDS:   Current Outpatient Prescriptions:   •  MethylPREDNISolone (MEDROL DOSEPAK) 4 MG Tablet Therapy Pack, UAD, Disp: 1 Kit, Rfl: 0  •  colchicine (COLCRYS) 0.6 MG Tab, Take 2 tabs at onset of symptoms, then repeat with 1 tab in 1 hour- do not repeat within 3 days., Disp: 9 Tab, Rfl: 0  •  allopurinol (ZYLOPRIM) 100 MG Tab, 1 TAB ONCE A DAY TO PREVENT GOUT ATTACK. START AFTER CURRENT ATTACK IS OVER., Disp: 30 Tab, Rfl: 1  •  allopurinol (ZYLOPRIM) 100 MG Tab, Take 1 Tab by mouth every day., Disp: 30 Tab, Rfl: 2  •  colchicine (COLCRYS) 0.6 MG Tab, Take 2 tabs now and repeat in one hour with one tab., Disp: 3 Tab, Rfl: 0  •  MethylPREDNISolone (MEDROL DOSEPAK) 4 MG Tablet Therapy Pack, Follow package instructions., Disp: 1 Kit, Rfl: 0  •  MethylPREDNISolone (MEDROL DOSEPAK) 4 MG Tablet Therapy Pack, TAKE AS DIRECTED ON PACKAGE., Disp: 21 Tab, Rfl: 0  •  colchicine (COLCRYS) 0.6 MG Tab, TAKE 2 TABS AT SIGNS OF GOUT ATTACK, THEN 1 TAB ONE HOUR AFTER FIRST DOSE. DO NOT REPEAT EARLIER THAN 3 DAYS., Disp: 15 Tab, Rfl: 0  ALLERGIES: No Known Allergies  SURGHX:   Past Surgical History:   Procedure Laterality Date   • ANKLE ARTHROSCOPY  5/19/2010    Performed by IRMA MELISSA at SURGERY SAME DAY [facility] ORS   • OTHER ABDOMINAL SURGERY  1997    COLOSTOMY AND REVERSAL   • OTHER ABDOMINAL SURGERY      spleenectomy 1997     SOCHX:  reports that he has quit smoking. He has never used smokeless tobacco. He reports that he does not drink alcohol or use drugs.  FH: Family history was reviewed, no pertinent findings to report    Physical Exam   Constitutional: He is oriented to person, place, and time. He appears well-developed and well-nourished. No distress.   HENT:   Head: Normocephalic and atraumatic.   Eyes: Conjunctivae and EOM are normal. Pupils are equal, round, and reactive to light.   Neck: Normal range of  motion. Neck supple. No tracheal deviation present.   Cardiovascular: Normal rate and regular rhythm.    No murmur heard.  Pulmonary/Chest: Effort normal and breath sounds normal. No respiratory distress.   Musculoskeletal:        Feet:    Right ankle- lateral aspect with erythema, edema, and increased warmth with tenderness. FROM- although painful. Without skin breakdown, streaking. N/v intact distally.    Neurological: He is alert and oriented to person, place, and time. Coordination normal.   Skin: Skin is warm. No rash noted.   Psychiatric: He has a normal mood and affect. His behavior is normal. Judgment and thought content normal.   Vitals reviewed.              Assessment/Plan:     1. Acute gout of right ankle, unspecified cause  2. History of gout    Colchicine along with medrol taper were started today. Patient is to avoid concomitant NSAID use at this time. Pt. Is to stop allopurinol as well and will restart this after his flare has resolved. Diet changes were discussed.   Patient given precautionary s/sx that mandate immediate follow up and evaluation in the ED. Advised of risks of not doing so.    DDX, Supportive care, and indications for immediate follow-up discussed with patient.    Instructed to return to clinic or nearest emergency department if we are not available for any change in condition, further concerns, or worsening of symptoms.    The patient demonstrated a good understanding and agreed with the treatment plan.

## 2018-04-28 ENCOUNTER — OFFICE VISIT (OUTPATIENT)
Dept: URGENT CARE | Facility: PHYSICIAN GROUP | Age: 38
End: 2018-04-28
Payer: COMMERCIAL

## 2018-04-28 VITALS
BODY MASS INDEX: 31.14 KG/M2 | DIASTOLIC BLOOD PRESSURE: 88 MMHG | SYSTOLIC BLOOD PRESSURE: 138 MMHG | HEART RATE: 70 BPM | WEIGHT: 235 LBS | OXYGEN SATURATION: 98 % | RESPIRATION RATE: 17 BRPM | HEIGHT: 73 IN | TEMPERATURE: 98.9 F

## 2018-04-28 DIAGNOSIS — M10.9 ACUTE GOUT OF RIGHT HAND, UNSPECIFIED CAUSE: ICD-10-CM

## 2018-04-28 PROCEDURE — 99214 OFFICE O/P EST MOD 30 MIN: CPT | Performed by: FAMILY MEDICINE

## 2018-04-28 RX ORDER — METHYLPREDNISOLONE 4 MG/1
4 TABLET ORAL DAILY
Qty: 1 KIT | Refills: 0 | Status: SHIPPED | OUTPATIENT
Start: 2018-04-28 | End: 2019-08-15

## 2018-04-28 RX ORDER — COLCHICINE 0.6 MG/1
0.6 TABLET ORAL 2 TIMES DAILY
Qty: 14 TAB | Refills: 0 | Status: SHIPPED | OUTPATIENT
Start: 2018-04-28 | End: 2018-05-05

## 2018-04-28 ASSESSMENT — PAIN SCALES - GENERAL: PAINLEVEL: 7=MODERATE-SEVERE PAIN

## 2018-04-28 NOTE — PROGRESS NOTES
Chief Complaint   Patient presents with   • Gout     right hand x 1 week       Subjective:      Chief Complaint   Patient presents with   • Gout     right hand x 1 week         C/o constant, throbbing  Rt hand pain x 5 d worse with use of the hand.   Denies any injury.   Pt has hx of gout and states that it feels like previous gout attacks       Pertinent negatives include no abdominal pain, chest pain, fever or headaches.         Social History   Substance Use Topics   • Smoking status: Former Smoker   • Smokeless tobacco: Never Used      Comment: vape   • Alcohol use No      Comment: 3 PER MONTH           Current Outpatient Prescriptions on File Prior to Visit   Medication Sig Dispense Refill   • allopurinol (ZYLOPRIM) 100 MG Tab 1 TAB ONCE A DAY TO PREVENT GOUT ATTACK. START AFTER CURRENT ATTACK IS OVER. 30 Tab 1   • MethylPREDNISolone (MEDROL DOSEPAK) 4 MG Tablet Therapy Pack UAD 1 Kit 0   • colchicine (COLCRYS) 0.6 MG Tab Take 2 tabs at onset of symptoms, then repeat with 1 tab in 1 hour- do not repeat within 3 days. 9 Tab 0   • allopurinol (ZYLOPRIM) 100 MG Tab Take 1 Tab by mouth every day. 30 Tab 2   • colchicine (COLCRYS) 0.6 MG Tab Take 2 tabs now and repeat in one hour with one tab. 3 Tab 0   • MethylPREDNISolone (MEDROL DOSEPAK) 4 MG Tablet Therapy Pack Follow package instructions. 1 Kit 0   • MethylPREDNISolone (MEDROL DOSEPAK) 4 MG Tablet Therapy Pack TAKE AS DIRECTED ON PACKAGE. 21 Tab 0   • colchicine (COLCRYS) 0.6 MG Tab TAKE 2 TABS AT SIGNS OF GOUT ATTACK, THEN 1 TAB ONE HOUR AFTER FIRST DOSE. DO NOT REPEAT EARLIER THAN 3 DAYS. 15 Tab 0     No current facility-administered medications on file prior to visit.        Past Medical History:   Diagnosis Date   • Gout, unspecified          Review of Systems   Constitutional: Negative for fever.   Cardiovascular: Negative for chest pain.   Gastrointestinal: Negative for abdominal pain.   Neurological: Negative for headaches.   All other systems reviewed  "and are negative.         Objective:     Blood pressure 138/88, pulse 70, temperature 37.2 °C (98.9 °F), resp. rate 17, height 1.854 m (6' 1\"), weight 106.6 kg (235 lb), SpO2 98 %.    Physical Exam   Constitutional: She is oriented to person, place, and time. She appears well-developed and well-nourished. No distress.   HENT:   Head: Normocephalic and atraumatic.   Eyes: Conjunctivae are normal.   Cardiovascular: Normal rate, regular rhythm and normal heart sounds.    Pulmonary/Chest: Effort normal and breath sounds normal. No respiratory distress.   Musculoskeletal:   There is erythema, swelling and tenderness diffusely over rt hand.   There are no breaks in skin integrity   Neurological: She is alert and oriented to person, place, and time.   Skin: Skin is warm. She is not diaphoretic. No erythema.   Nursing note and vitals reviewed.              Assessment/Plan:     1. Acute gout of right hand, unspecified cause     - MethylPREDNISolone (MEDROL DOSEPAK) 4 MG Tablet Therapy Pack; Take 1 Tab by mouth every day.  Dispense: 1 Kit; Refill: 0  - colchicine (COLCRYS) 0.6 MG Tab; Take 1 Tab by mouth 2 times a day for 7 days.  Dispense: 14 Tab; Refill: 0    Follow up in one week if no improvement, sooner if symptoms worsen.     "

## 2019-08-15 ENCOUNTER — OFFICE VISIT (OUTPATIENT)
Dept: URGENT CARE | Facility: PHYSICIAN GROUP | Age: 39
End: 2019-08-15
Payer: COMMERCIAL

## 2019-08-15 VITALS
DIASTOLIC BLOOD PRESSURE: 78 MMHG | OXYGEN SATURATION: 99 % | BODY MASS INDEX: 33.38 KG/M2 | SYSTOLIC BLOOD PRESSURE: 140 MMHG | TEMPERATURE: 97.9 F | RESPIRATION RATE: 16 BRPM | WEIGHT: 253 LBS | HEART RATE: 92 BPM

## 2019-08-15 DIAGNOSIS — M10.9 ACUTE GOUT OF LEFT ELBOW, UNSPECIFIED CAUSE: ICD-10-CM

## 2019-08-15 PROCEDURE — 99214 OFFICE O/P EST MOD 30 MIN: CPT | Performed by: PHYSICIAN ASSISTANT

## 2019-08-15 RX ORDER — METHYLPREDNISOLONE 4 MG/1
TABLET ORAL
Qty: 21 TAB | Refills: 0 | Status: SHIPPED | OUTPATIENT
Start: 2019-08-15 | End: 2020-06-13

## 2019-08-15 RX ORDER — PREDNISONE 10 MG/1
TABLET ORAL
Qty: 1 QUANTITY SUFFICIENT | Refills: 0 | Status: SHIPPED | OUTPATIENT
Start: 2019-08-15 | End: 2020-06-13

## 2019-08-15 NOTE — LETTER
August 15, 2019         Patient: Chase Hughes   YOB: 1980   Date of Visit: 8/15/2019           To Whom it May Concern:    Chase Hughes was seen in my clinic on 8/15/2019.     If you have any questions or concerns, please don't hesitate to call.        Sincerely,           Micki Gold P.A.-C.  Electronically Signed

## 2019-08-15 NOTE — PROGRESS NOTES
Chief Complaint   Patient presents with   • Gout     flareup (L)    • Knee Injury     chronic issue with swelling pain (L) comes and goes       HISTORY OF PRESENT ILLNESS: Patient is a 38 y.o. male who presents today for the following:    Patient comes in for evaluation of gout in his left elbow.  Patient states that started several days ago.  He has a history of gout but not for a long time.  He states the pain did keep him from work today and needs a note.  He has had pain in his left knee that started he states the methylprednisolone works the best but is wondering if he can have something for pain today because the pain is more than normal.  Patient does drive a truck for living.    Patient Active Problem List    Diagnosis Date Noted   • Obesity (BMI 30-39.9) 08/22/2017       Allergies:Patient has no known allergies.    Current Outpatient Medications Ordered in Epic   Medication Sig Dispense Refill   • methylPREDNISolone (MEDROL DOSEPAK) 4 MG Tablet Therapy Pack Use as package directs 21 Tab 0   • predniSONE (DELTASONE) 10 MG Tab 50 mg x 1 day; 40 mg x 1 day; 30 mg x 1 day; 20 mg x 1 day; 10 mg x 1 day 1 Quantity Sufficient 0     No current Epic-ordered facility-administered medications on file.        Past Medical History:   Diagnosis Date   • Gout, unspecified        Social History     Tobacco Use   • Smoking status: Former Smoker   • Smokeless tobacco: Never Used   • Tobacco comment: vape   Substance Use Topics   • Alcohol use: No     Alcohol/week: 0.0 oz     Comment: 3 PER MONTH   • Drug use: No       No family status information on file.   No family history on file.    Review of Systems:   Constitutional ROS: No unexpected change in weight, No weakness, No fatigue  Eye ROS: No recent significant change in vision, No eye pain, redness, discharge  Ear ROS: No drainage, No tinnitus or vertigo, No recent change in hearing  Mouth/Throat ROS: No teeth or gum problems, No bleeding gums, No tongue complaints  Neck  ROS: No swollen glands, No significant pain in neck  Pulmonary ROS: No chronic cough, sputum, or hemoptysis, No dyspnea on exertion, No wheezing  Cardiovascular ROS: No diaphoresis, No edema, No palpitations  Gastrointestinal ROS: No change in bowel habits, No significant change in appetite, No nausea, vomiting, diarrhea, or constipation  Musculoskeletal/Extremities ROS: Left elbow and left knee pain.  Hematologic/Lymphatic ROS: No chills, No night sweats, No weight loss  Skin/Integumentary ROS: No edema, No evidence of rash, No itching      Exam:  /78   Pulse 92   Temp 36.6 °C (97.9 °F)   Resp 16   Wt 114.8 kg (253 lb)   SpO2 99%   General: Well developed, well nourished. No distress.  Pulmonary: Unlabored respiratory effort.   Extremities: Tenderness with light touch noted on the left elbow.  Erythema and warmth is noted as well.  No localized tenderness noted on the left knee.  Neurologic: Grossly nonfocal. No facial asymmetry noted.  Skin: Warm, dry, good turgor. No rashes in visible areas.   Psych: Normal mood. Alert and oriented x3. Judgment and insight is normal.    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  Will dose a higher steroid today and provide a methylprednisolone pack to have on hand when he is on the road for his next flare.  Follow up for worsening or persistent symptoms.  1. Acute gout of left elbow, unspecified cause  methylPREDNISolone (MEDROL DOSEPAK) 4 MG Tablet Therapy Pack    predniSONE (DELTASONE) 10 MG Tab

## 2019-08-18 ENCOUNTER — OFFICE VISIT (OUTPATIENT)
Dept: URGENT CARE | Facility: PHYSICIAN GROUP | Age: 39
End: 2019-08-18
Payer: COMMERCIAL

## 2019-08-18 VITALS — HEART RATE: 94 BPM | BODY MASS INDEX: 33.51 KG/M2 | WEIGHT: 254 LBS | TEMPERATURE: 97.9 F | OXYGEN SATURATION: 92 %

## 2019-08-18 DIAGNOSIS — M10.9 ACUTE GOUT OF LEFT ELBOW, UNSPECIFIED CAUSE: ICD-10-CM

## 2019-08-18 PROCEDURE — 99214 OFFICE O/P EST MOD 30 MIN: CPT | Performed by: FAMILY MEDICINE

## 2019-08-18 RX ORDER — KETOROLAC TROMETHAMINE 30 MG/ML
60 INJECTION, SOLUTION INTRAMUSCULAR; INTRAVENOUS ONCE
Status: COMPLETED | OUTPATIENT
Start: 2019-08-18 | End: 2019-08-18

## 2019-08-18 RX ORDER — INDOMETHACIN 50 MG/1
CAPSULE ORAL
Qty: 30 CAP | Refills: 3 | Status: SHIPPED | OUTPATIENT
Start: 2019-08-18 | End: 2020-06-13

## 2019-08-18 RX ADMIN — KETOROLAC TROMETHAMINE 60 MG: 30 INJECTION, SOLUTION INTRAMUSCULAR; INTRAVENOUS at 13:16

## 2019-08-18 NOTE — PROGRESS NOTES
Chief Complaint:    Chief Complaint   Patient presents with   • Gout     L elbow       History of Present Illness:    He has pain and swelling in left elbow he feels is due to gout. He was seen here on 8/15/19 and prescribed Medrol Dose North and Prednisone course. He is taking the Prednisone and the Medrol Dose North was to be reserved for a future attack. His left knee was hurting at visit 8/15/19 and is improved today. However, left elbow is not getting better. He reports Colchicine did not help too well in past. He would like to get some Indocin to use.      Review of Systems:    Constitutional: Negative for fever, chills, and diaphoresis.   Eyes: Negative for change in vision, photophobia, pain, redness, and discharge.  ENT: Negative for ear pain, ear discharge, hearing loss, tinnitus, nasal congestion, nosebleeds, and sore throat.    Respiratory: Negative for cough, hemoptysis, sputum production, shortness of breath, wheezing, and stridor.    Cardiovascular: Negative for chest pain, palpitations, orthopnea, claudication, leg swelling, and PND.   Gastrointestinal: Negative for abdominal pain, nausea, vomiting, diarrhea, constipation, blood in stool, and melena.   Genitourinary: Negative for dysuria, urinary urgency, urinary frequency, hematuria, and flank pain.   Musculoskeletal: See HPI.  Skin: Negative for rash and itching.   Neurological: Negative for dizziness, tingling, tremors, sensory change, speech change, focal weakness, seizures, loss of consciousness, and headaches.   Endo: Negative for polydipsia.   Heme: Does not bruise/bleed easily.   Psychiatric/Behavioral: Negative for depression, suicidal ideas, hallucinations, memory loss and substance abuse. The patient is not nervous/anxious and does not have insomnia.        Past Medical History:    Past Medical History:   Diagnosis Date   • Gout, unspecified      Past Surgical History:    Past Surgical History:   Procedure Laterality Date   • ANKLE ARTHROSCOPY   5/19/2010    Performed by IRMA MELISSA at SURGERY SAME DAY [facility] ORS   • OTHER ABDOMINAL SURGERY  1997    COLOSTOMY AND REVERSAL   • OTHER ABDOMINAL SURGERY      spleenectomy 1997     Social History:    Social History     Socioeconomic History   • Marital status:      Spouse name: Not on file   • Number of children: Not on file   • Years of education: Not on file   • Highest education level: Not on file   Occupational History   • Not on file   Social Needs   • Financial resource strain: Not on file   • Food insecurity:     Worry: Not on file     Inability: Not on file   • Transportation needs:     Medical: Not on file     Non-medical: Not on file   Tobacco Use   • Smoking status: Former Smoker   • Smokeless tobacco: Never Used   • Tobacco comment: vape   Substance and Sexual Activity   • Alcohol use: No     Alcohol/week: 0.0 oz     Comment: 3 PER MONTH   • Drug use: No   • Sexual activity: Not on file   Lifestyle   • Physical activity:     Days per week: Not on file     Minutes per session: Not on file   • Stress: Not on file   Relationships   • Social connections:     Talks on phone: Not on file     Gets together: Not on file     Attends [employer] service: Not on file     Active member of club or organization: Not on file     Attends meetings of clubs or organizations: Not on file     Relationship status: Not on file   • Intimate partner violence:     Fear of current or ex partner: Not on file     Emotionally abused: Not on file     Physically abused: Not on file     Forced sexual activity: Not on file   Other Topics Concern   • Not on file   Social History Narrative   • Not on file     Family History:    No family history on file.    Medications:    Current Outpatient Medications on File Prior to Visit   Medication Sig Dispense Refill   • methylPREDNISolone (MEDROL DOSEPAK) 4 MG Tablet Therapy Pack Use as package directs 21 Tab 0   • predniSONE (DELTASONE) 10 MG Tab 50 mg x 1 day; 40 mg x 1 day; 30  mg x 1 day; 20 mg x 1 day; 10 mg x 1 day 1 Quantity Sufficient 0     No current facility-administered medications on file prior to visit.      Allergies:    No Known Allergies      Vitals:    Vitals:    08/18/19 1235   Pulse: 94   Temp: 36.6 °C (97.9 °F)   TempSrc: Temporal   SpO2: 92%   Weight: 115.2 kg (254 lb)       Physical Exam:    Constitutional: Vital signs reviewed. Appears well-developed and well-nourished. No acute distress.   Eyes: Sclera white, conjunctivae clear.  ENT: External ears normal. Hearing normal.  Cardiovascular: Peripheral pulses 2+.   Pulmonary/Chest: Respirations non-labored.  Musculoskeletal: Left elbow has diffuse soft tissue swelling, tenderness to palpation, and associated decreased range of motion.  Neurological: Alert and oriented to person, place, and time. Muscle tone normal. Coordination normal. Light touch and sensation normal.  Skin: No rashes or lesions. Warm, dry, normal turgor.  Psychiatric: Normal mood and affect. Behavior is normal. Judgment and thought content normal.       Assessment / Plan:    1. Acute gout of left elbow, unspecified cause  - ketorolac (TORADOL) injection 60 mg  - indomethacin (INDOCIN) 50 MG Cap; 1 TAB BY MOUTH EVERY 8 HOURS ONLY IF NEEDED FOR PAIN, SWELLING, AND INFLAMMATION. START ON 8/19/19. TAKE WITH FOOD.  Dispense: 30 Cap; Refill: 3      Discussed with him DDX, management options, and risks, benefits, and alternatives to treatment plan agreed upon.    He will finish Prednisone course prescribed on 8/15/19.    Agreeable to medications given and prescribed.    Discussed expected course of duration, time for improvement, and to seek follow-up in Emergency Room, urgent care, or with PCP if getting worse at any time or not improving within expected time frame.

## 2020-03-14 ENCOUNTER — OFFICE VISIT (OUTPATIENT)
Dept: URGENT CARE | Facility: PHYSICIAN GROUP | Age: 40
End: 2020-03-14
Payer: COMMERCIAL

## 2020-03-14 VITALS
BODY MASS INDEX: 31.53 KG/M2 | HEART RATE: 96 BPM | SYSTOLIC BLOOD PRESSURE: 140 MMHG | RESPIRATION RATE: 14 BRPM | WEIGHT: 239 LBS | DIASTOLIC BLOOD PRESSURE: 96 MMHG | OXYGEN SATURATION: 94 % | TEMPERATURE: 97.8 F

## 2020-03-14 DIAGNOSIS — M10.471 OTHER SECONDARY ACUTE GOUT OF RIGHT ANKLE: Primary | ICD-10-CM

## 2020-03-14 PROCEDURE — 99212 OFFICE O/P EST SF 10 MIN: CPT | Performed by: FAMILY MEDICINE

## 2020-03-14 RX ORDER — KETOROLAC TROMETHAMINE 30 MG/ML
60 INJECTION, SOLUTION INTRAMUSCULAR; INTRAVENOUS ONCE
Status: COMPLETED | OUTPATIENT
Start: 2020-03-14 | End: 2020-03-14

## 2020-03-14 RX ORDER — METHYLPREDNISOLONE 4 MG/1
TABLET ORAL
Qty: 21 TAB | Refills: 0 | Status: SHIPPED | OUTPATIENT
Start: 2020-03-14 | End: 2020-06-13

## 2020-03-14 RX ORDER — OMEPRAZOLE 20 MG/1
20 CAPSULE, DELAYED RELEASE ORAL DAILY
Qty: 15 CAP | Refills: 0 | Status: SHIPPED | OUTPATIENT
Start: 2020-03-14 | End: 2020-08-21

## 2020-03-14 RX ORDER — IBUPROFEN 800 MG/1
800 TABLET ORAL EVERY 8 HOURS PRN
Qty: 30 TAB | Refills: 0 | Status: SHIPPED | OUTPATIENT
Start: 2020-03-15 | End: 2020-06-13

## 2020-03-14 RX ADMIN — KETOROLAC TROMETHAMINE 60 MG: 30 INJECTION, SOLUTION INTRAMUSCULAR; INTRAVENOUS at 10:27

## 2020-03-14 ASSESSMENT — PAIN SCALES - GENERAL: PAINLEVEL: 7=MODERATE-SEVERE PAIN

## 2020-03-14 NOTE — PROGRESS NOTES
Subjective:      Chase Hughes is a 39 y.o. male who presents with Gout (R 4th digit toe pain/ Pt is requesting for medrol dose pack )            This is a 39-year-old male with past medical history of gout, is here today for acute gout flareup on the right third and fourth toes, patient said that few days ago he was binge drinking, and after that he had a flareup, does not the first time he is had a flareup in the past      Review of Systems   Musculoskeletal: Positive for joint pain (right toe pain).   All other systems reviewed and are negative.         Objective:     /96   Pulse 96   Temp 36.6 °C (97.8 °F) (Temporal)   Resp 14   Wt 108.4 kg (239 lb)   SpO2 94%   BMI 31.53 kg/m²      Physical Exam  Constitutional:       General: He is not in acute distress.     Appearance: Normal appearance. He is normal weight. He is not ill-appearing, toxic-appearing or diaphoretic.   HENT:      Head: Atraumatic.      Nose: Nose normal.      Mouth/Throat:      Mouth: Mucous membranes are moist.   Eyes:      Pupils: Pupils are equal, round, and reactive to light.   Neck:      Musculoskeletal: Normal range of motion.   Cardiovascular:      Rate and Rhythm: Normal rate.      Pulses: Normal pulses.   Pulmonary:      Effort: Pulmonary effort is normal.   Abdominal:      General: Abdomen is flat.   Musculoskeletal:         General: Swelling and tenderness present. No deformity or signs of injury.      Right lower leg: No edema.      Left lower leg: No edema.        Feet:    Skin:     General: Skin is warm.      Capillary Refill: Capillary refill takes less than 2 seconds.   Neurological:      General: No focal deficit present.      Mental Status: He is alert.   Psychiatric:         Mood and Affect: Mood normal.                 Assessment/Plan:       1. Other secondary acute gout of right ankle    - ketorolac (TORADOL) injection 60 mg  - ibuprofen (MOTRIN) 800 MG Tab; Take 1 Tab by mouth every 8 hours as needed.   Dispense: 30 Tab; Refill: 0  - methylPREDNISolone (MEDROL DOSEPAK) 4 MG Tablet Therapy Pack; Follow schedule on package instructions.  Dispense: 21 Tab; Refill: 0  - omeprazole (PRILOSEC) 20 MG delayed-release capsule; Take 1 Cap by mouth every day.  Dispense: 15 Cap; Refill: 0    -Patient received Toradol shot in the clinic, I told him to use the ibuprofen starting tomorrow, I also explained to patient that take the medication on full stomach with meals, until the inflammation decreases, we also give the patient a Medrol pack due to the extent of the gout attack  Also explained to the patient and educate the patient regarding cessation of protein, including red meat, and any type of meat until the gout flareup decreases and also educate patient regarding not using any alcohol  Patient also was encouraged to talk to primary care physician regarding allopurinol use which in the past he used.  This come back to clinic as needed or if you have any other concerns

## 2020-04-08 ENCOUNTER — OFFICE VISIT (OUTPATIENT)
Dept: URGENT CARE | Facility: PHYSICIAN GROUP | Age: 40
End: 2020-04-08
Payer: COMMERCIAL

## 2020-04-08 VITALS
WEIGHT: 246 LBS | BODY MASS INDEX: 32.46 KG/M2 | SYSTOLIC BLOOD PRESSURE: 138 MMHG | RESPIRATION RATE: 16 BRPM | HEART RATE: 90 BPM | OXYGEN SATURATION: 97 % | TEMPERATURE: 97 F | DIASTOLIC BLOOD PRESSURE: 98 MMHG

## 2020-04-08 DIAGNOSIS — M10.071 ACUTE IDIOPATHIC GOUT OF RIGHT FOOT: ICD-10-CM

## 2020-04-08 PROCEDURE — 99214 OFFICE O/P EST MOD 30 MIN: CPT | Performed by: NURSE PRACTITIONER

## 2020-04-08 RX ORDER — METHYLPREDNISOLONE 4 MG/1
TABLET ORAL
Qty: 21 TAB | Refills: 0 | Status: SHIPPED | OUTPATIENT
Start: 2020-04-08 | End: 2020-06-13

## 2020-04-08 RX ORDER — ALLOPURINOL 300 MG/1
300 TABLET ORAL DAILY
Qty: 90 TAB | Refills: 1 | Status: SHIPPED | OUTPATIENT
Start: 2020-04-08 | End: 2020-08-21

## 2020-04-08 RX ORDER — KETOROLAC TROMETHAMINE 30 MG/ML
60 INJECTION, SOLUTION INTRAMUSCULAR; INTRAVENOUS ONCE
Status: COMPLETED | OUTPATIENT
Start: 2020-04-08 | End: 2020-04-08

## 2020-04-08 RX ADMIN — KETOROLAC TROMETHAMINE 60 MG: 30 INJECTION, SOLUTION INTRAMUSCULAR; INTRAVENOUS at 09:26

## 2020-04-08 ASSESSMENT — ENCOUNTER SYMPTOMS
MYALGIAS: 0
CHILLS: 0
SENSORY CHANGE: 0
TINGLING: 0
FEVER: 0
ROS SKIN COMMENTS: +ERYTHEMA

## 2020-04-08 ASSESSMENT — PAIN SCALES - GENERAL: PAINLEVEL: 7=MODERATE-SEVERE PAIN

## 2020-04-08 NOTE — PROGRESS NOTES
Subjective:      Chase Hughes is a 39 y.o. male who presents with Gout (R foot x2d )            HPI New. 39 year old male with gout to right foot x 2 days. Denies fever, chills, myalgia or other joint pain with this. Pain is severe and accompanied by swelling and some erythema. Has had extensive history of this in past. He has not taken any medication for this.  Patient has no known allergies.  Current Outpatient Medications on File Prior to Visit   Medication Sig Dispense Refill   • ibuprofen (MOTRIN) 800 MG Tab Take 1 Tab by mouth every 8 hours as needed. 30 Tab 0   • methylPREDNISolone (MEDROL DOSEPAK) 4 MG Tablet Therapy Pack Follow schedule on package instructions. 21 Tab 0   • omeprazole (PRILOSEC) 20 MG delayed-release capsule Take 1 Cap by mouth every day. 15 Cap 0   • indomethacin (INDOCIN) 50 MG Cap 1 TAB BY MOUTH EVERY 8 HOURS ONLY IF NEEDED FOR PAIN, SWELLING, AND INFLAMMATION. START ON 8/19/19. TAKE WITH FOOD. (Patient not taking: Reported on 3/14/2020) 30 Cap 3   • methylPREDNISolone (MEDROL DOSEPAK) 4 MG Tablet Therapy Pack Use as package directs (Patient not taking: Reported on 3/14/2020) 21 Tab 0   • predniSONE (DELTASONE) 10 MG Tab 50 mg x 1 day; 40 mg x 1 day; 30 mg x 1 day; 20 mg x 1 day; 10 mg x 1 day (Patient not taking: Reported on 3/14/2020) 1 Quantity Sufficient 0     No current facility-administered medications on file prior to visit.      Social History     Socioeconomic History   • Marital status:      Spouse name: Not on file   • Number of children: Not on file   • Years of education: Not on file   • Highest education level: Not on file   Occupational History   • Not on file   Social Needs   • Financial resource strain: Not on file   • Food insecurity     Worry: Not on file     Inability: Not on file   • Transportation needs     Medical: Not on file     Non-medical: Not on file   Tobacco Use   • Smoking status: Former Smoker   • Smokeless tobacco: Never Used   • Tobacco  comment: vape   Substance and Sexual Activity   • Alcohol use: No     Alcohol/week: 0.0 oz     Comment: 3 PER MONTH   • Drug use: No   • Sexual activity: Not on file   Lifestyle   • Physical activity     Days per week: Not on file     Minutes per session: Not on file   • Stress: Not on file   Relationships   • Social connections     Talks on phone: Not on file     Gets together: Not on file     Attends Yarsanism service: Not on file     Active member of club or organization: Not on file     Attends meetings of clubs or organizations: Not on file     Relationship status: Not on file   • Intimate partner violence     Fear of current or ex partner: Not on file     Emotionally abused: Not on file     Physically abused: Not on file     Forced sexual activity: Not on file   Other Topics Concern   • Not on file   Social History Narrative   • Not on file     Breast Cancer-related family history is not on file.      Review of Systems   Constitutional: Negative for chills and fever.   Musculoskeletal: Positive for joint pain. Negative for myalgias.   Skin:        +erythema   Neurological: Negative for tingling and sensory change.          Objective:     /98   Pulse 90   Temp 36.1 °C (97 °F) (Temporal)   Resp 16   Wt 111.6 kg (246 lb)   SpO2 97%   BMI 32.46 kg/m²      Physical Exam  Constitutional:       Appearance: Normal appearance. He is not ill-appearing.   Cardiovascular:      Rate and Rhythm: Normal rate and regular rhythm.      Heart sounds: No murmur.   Pulmonary:      Effort: Pulmonary effort is normal.      Breath sounds: Normal breath sounds. No stridor.   Musculoskeletal:      Right foot: Decreased range of motion. Tenderness, bony tenderness and swelling present.        Feet:    Skin:     General: Skin is warm and dry.      Findings: Erythema present.   Neurological:      General: No focal deficit present.      Mental Status: He is alert and oriented to person, place, and time.                    Assessment/Plan:       1. Acute idiopathic gout of right foot  methylPREDNISolone (MEDROL DOSEPAK) 4 MG Tablet Therapy Pack    ketorolac (TORADOL) injection 60 mg    allopurinol (ZYLOPRIM) 300 MG Tab     Start allopurinol after this acute episode.  Differential diagnosis, natural history, supportive care, and indications for immediate follow-up discussed at length.

## 2020-04-15 ENCOUNTER — OFFICE VISIT (OUTPATIENT)
Dept: URGENT CARE | Facility: PHYSICIAN GROUP | Age: 40
End: 2020-04-15
Payer: COMMERCIAL

## 2020-04-15 VITALS
DIASTOLIC BLOOD PRESSURE: 84 MMHG | SYSTOLIC BLOOD PRESSURE: 136 MMHG | OXYGEN SATURATION: 98 % | BODY MASS INDEX: 33.13 KG/M2 | WEIGHT: 250 LBS | HEART RATE: 82 BPM | TEMPERATURE: 98.7 F | HEIGHT: 73 IN

## 2020-04-15 DIAGNOSIS — M10.042 ACUTE IDIOPATHIC GOUT OF LEFT HAND: ICD-10-CM

## 2020-04-15 PROCEDURE — 99214 OFFICE O/P EST MOD 30 MIN: CPT | Performed by: NURSE PRACTITIONER

## 2020-04-15 RX ORDER — PREDNISONE 20 MG/1
TABLET ORAL
Qty: 10 TAB | Refills: 0 | Status: SHIPPED | OUTPATIENT
Start: 2020-04-15 | End: 2020-06-13

## 2020-04-15 RX ORDER — KETOROLAC TROMETHAMINE 30 MG/ML
60 INJECTION, SOLUTION INTRAMUSCULAR; INTRAVENOUS ONCE
Status: COMPLETED | OUTPATIENT
Start: 2020-04-15 | End: 2020-04-15

## 2020-04-15 RX ADMIN — KETOROLAC TROMETHAMINE 60 MG: 30 INJECTION, SOLUTION INTRAMUSCULAR; INTRAVENOUS at 09:20

## 2020-04-15 ASSESSMENT — ENCOUNTER SYMPTOMS
CHILLS: 0
ROS SKIN COMMENTS: +ERYTHEMA
FEVER: 0
NAUSEA: 0
SENSORY CHANGE: 0
TINGLING: 0

## 2020-04-15 NOTE — PROGRESS NOTES
Subjective:      Chase Hughes is a 39 y.o. male who presents with Gout (x 1 day / LT index finger)            HPI New. 39 year old male with gout in left hand/index finger x one day. Per his report, he has figured that this is likely caused from protein shakes. He has had several attacks since starting these. He has redness and swelling of this area as well as moderate, constant pain. Denies fever, chills, other joint pain, nausea or headache. He has not taken any medication for this today. History of repeated recent gout episodes.    Patient has no known allergies.  Current Outpatient Medications on File Prior to Visit   Medication Sig Dispense Refill   • methylPREDNISolone (MEDROL DOSEPAK) 4 MG Tablet Therapy Pack Follow schedule on package instructions. 21 Tab 0   • allopurinol (ZYLOPRIM) 300 MG Tab Take 1 Tab by mouth every day. 90 Tab 1   • methylPREDNISolone (MEDROL DOSEPAK) 4 MG Tablet Therapy Pack Follow schedule on package instructions. 21 Tab 0   • omeprazole (PRILOSEC) 20 MG delayed-release capsule Take 1 Cap by mouth every day. 15 Cap 0   • ibuprofen (MOTRIN) 800 MG Tab Take 1 Tab by mouth every 8 hours as needed. (Patient not taking: Reported on 4/15/2020) 30 Tab 0   • indomethacin (INDOCIN) 50 MG Cap 1 TAB BY MOUTH EVERY 8 HOURS ONLY IF NEEDED FOR PAIN, SWELLING, AND INFLAMMATION. START ON 8/19/19. TAKE WITH FOOD. (Patient not taking: Reported on 3/14/2020) 30 Cap 3   • methylPREDNISolone (MEDROL DOSEPAK) 4 MG Tablet Therapy Pack Use as package directs (Patient not taking: Reported on 3/14/2020) 21 Tab 0   • predniSONE (DELTASONE) 10 MG Tab 50 mg x 1 day; 40 mg x 1 day; 30 mg x 1 day; 20 mg x 1 day; 10 mg x 1 day (Patient not taking: Reported on 3/14/2020) 1 Quantity Sufficient 0     No current facility-administered medications on file prior to visit.      Social History     Socioeconomic History   • Marital status:      Spouse name: Not on file   • Number of children: Not on file   • Years  "of education: Not on file   • Highest education level: Not on file   Occupational History   • Not on file   Social Needs   • Financial resource strain: Not on file   • Food insecurity     Worry: Not on file     Inability: Not on file   • Transportation needs     Medical: Not on file     Non-medical: Not on file   Tobacco Use   • Smoking status: Former Smoker   • Smokeless tobacco: Never Used   • Tobacco comment: vape   Substance and Sexual Activity   • Alcohol use: No     Alcohol/week: 0.0 oz     Comment: 3 PER MONTH   • Drug use: No   • Sexual activity: Not on file   Lifestyle   • Physical activity     Days per week: Not on file     Minutes per session: Not on file   • Stress: Not on file   Relationships   • Social connections     Talks on phone: Not on file     Gets together: Not on file     Attends Druze service: Not on file     Active member of club or organization: Not on file     Attends meetings of clubs or organizations: Not on file     Relationship status: Not on file   • Intimate partner violence     Fear of current or ex partner: Not on file     Emotionally abused: Not on file     Physically abused: Not on file     Forced sexual activity: Not on file   Other Topics Concern   • Not on file   Social History Narrative   • Not on file     Breast Cancer-related family history is not on file.      Review of Systems   Constitutional: Negative for chills and fever.   HENT: Negative for congestion.    Gastrointestinal: Negative for nausea.   Musculoskeletal: Positive for joint pain.   Skin:        +erythema   Neurological: Negative for tingling and sensory change.          Objective:     /84   Pulse 82   Temp 37.1 °C (98.7 °F) (Temporal)   Ht 1.854 m (6' 1\")   Wt 113.4 kg (250 lb)   SpO2 98%   BMI 32.98 kg/m²      Physical Exam  Constitutional:       Appearance: Normal appearance. He is not ill-appearing.   Cardiovascular:      Rate and Rhythm: Normal rate and regular rhythm.      Heart sounds: No " murmur.   Pulmonary:      Effort: Pulmonary effort is normal.      Breath sounds: Normal breath sounds.   Musculoskeletal:      Left hand: He exhibits decreased range of motion, bony tenderness and swelling.        Hands:    Skin:     General: Skin is warm and dry.      Findings: Erythema present.   Neurological:      Mental Status: He is alert.                 Assessment/Plan:       1. Acute idiopathic gout of left hand  ketorolac (TORADOL) injection 60 mg    predniSONE (DELTASONE) 20 MG Tab     Stop protein shakes.  Toradol/prednisone   Differential diagnosis, natural history, supportive care, and indications for immediate follow-up discussed at length.

## 2020-06-13 ENCOUNTER — OFFICE VISIT (OUTPATIENT)
Dept: URGENT CARE | Facility: PHYSICIAN GROUP | Age: 40
End: 2020-06-13
Payer: COMMERCIAL

## 2020-06-13 VITALS
TEMPERATURE: 98.4 F | HEART RATE: 86 BPM | RESPIRATION RATE: 18 BRPM | OXYGEN SATURATION: 98 % | WEIGHT: 251 LBS | SYSTOLIC BLOOD PRESSURE: 130 MMHG | BODY MASS INDEX: 33.27 KG/M2 | HEIGHT: 73 IN | DIASTOLIC BLOOD PRESSURE: 90 MMHG

## 2020-06-13 DIAGNOSIS — M10.021 ACUTE IDIOPATHIC GOUT OF RIGHT ELBOW: ICD-10-CM

## 2020-06-13 PROCEDURE — 99214 OFFICE O/P EST MOD 30 MIN: CPT | Performed by: PHYSICIAN ASSISTANT

## 2020-06-13 RX ORDER — KETOROLAC TROMETHAMINE 30 MG/ML
60 INJECTION, SOLUTION INTRAMUSCULAR; INTRAVENOUS ONCE
Status: COMPLETED | OUTPATIENT
Start: 2020-06-13 | End: 2020-06-13

## 2020-06-13 RX ORDER — PREDNISONE 20 MG/1
TABLET ORAL
Qty: 10 TAB | Refills: 1 | Status: SHIPPED | OUTPATIENT
Start: 2020-06-13 | End: 2020-08-21

## 2020-06-13 RX ADMIN — KETOROLAC TROMETHAMINE 60 MG: 30 INJECTION, SOLUTION INTRAMUSCULAR; INTRAVENOUS at 09:52

## 2020-06-13 ASSESSMENT — ENCOUNTER SYMPTOMS
TINGLING: 0
RESPIRATORY NEGATIVE: 1
CONSTITUTIONAL NEGATIVE: 1
FALLS: 0
NUMBNESS: 0
NEUROLOGICAL NEGATIVE: 1
CARDIOVASCULAR NEGATIVE: 1
MUSCLE WEAKNESS: 0
PALPITATIONS: 0

## 2020-06-13 NOTE — PROGRESS NOTES
Subjective:      Chase Hughes is a 39 y.o. male who presents with Gout            Gout flare right elbow.  Long history worse recently.  Usually takes allopurinol.  Denies fever chills or injury.    Arm Pain    The incident occurred 3 to 5 days ago. The incident occurred at home. There was no injury mechanism. The pain is present in the right elbow. The quality of the pain is described as aching. The pain does not radiate. The pain is at a severity of 7/10. The pain is moderate. The pain has been constant since the incident. Pertinent negatives include no chest pain, muscle weakness, numbness or tingling. The symptoms are aggravated by palpation, lifting and movement. He has tried NSAIDs for the symptoms. The treatment provided mild relief.         PMH:  has a past medical history of Gout, unspecified.  MEDS:   Current Outpatient Medications:   •  predniSONE (DELTASONE) 20 MG Tab, Take 2 tabs PO QD x 5 days, Disp: 10 Tab, Rfl: 1  •  allopurinol (ZYLOPRIM) 300 MG Tab, Take 1 Tab by mouth every day., Disp: 90 Tab, Rfl: 1  •  omeprazole (PRILOSEC) 20 MG delayed-release capsule, Take 1 Cap by mouth every day. (Patient not taking: Reported on 6/13/2020), Disp: 15 Cap, Rfl: 0    Current Facility-Administered Medications:   •  ketorolac (TORADOL) injection 60 mg, 60 mg, Intramuscular, Once, Deshawn Prince P.A.-C.  ALLERGIES: No Known Allergies  SURGHX:   Past Surgical History:   Procedure Laterality Date   • ANKLE ARTHROSCOPY  5/19/2010    Performed by IRMA MELISSA at SURGERY SAME DAY [facility] ORS   • OTHER ABDOMINAL SURGERY  1997    COLOSTOMY AND REVERSAL   • OTHER ABDOMINAL SURGERY      spleenectomy 1997     SOCHX:  reports that he has quit smoking. He has never used smokeless tobacco. He reports that he does not drink alcohol or use drugs.  FH: family history is not on file.    Review of Systems   Constitutional: Negative.    Respiratory: Negative.    Cardiovascular: Negative.  Negative for chest pain and  "palpitations.   Musculoskeletal: Positive for joint pain. Negative for falls.   Neurological: Negative.  Negative for tingling and numbness.       Medications, Allergies, and current problem list reviewed today in Epic     Objective:     /90   Pulse 86   Temp 36.9 °C (98.4 °F) (Temporal)   Resp 18   Ht 1.854 m (6' 1\")   Wt 113.9 kg (251 lb)   SpO2 98%   BMI 33.12 kg/m²      Physical Exam  Vitals signs and nursing note reviewed.   Constitutional:       General: He is not in acute distress.     Appearance: He is well-developed. He is not diaphoretic.   HENT:      Head: Normocephalic and atraumatic.   Eyes:      Conjunctiva/sclera: Conjunctivae normal.   Neck:      Musculoskeletal: Normal range of motion and neck supple.   Cardiovascular:      Rate and Rhythm: Normal rate and regular rhythm.      Heart sounds: Normal heart sounds. No murmur.   Pulmonary:      Effort: Pulmonary effort is normal. No respiratory distress.      Breath sounds: Normal breath sounds. No wheezing.   Musculoskeletal:      Right elbow: He exhibits decreased range of motion and swelling. Tenderness found.   Skin:     General: Skin is warm and dry.   Neurological:      Mental Status: He is alert and oriented to person, place, and time.   Psychiatric:         Behavior: Behavior normal.         Thought Content: Thought content normal.         Judgment: Judgment normal.                 Assessment/Plan:     1. Acute idiopathic gout of right elbow  ketorolac (TORADOL) injection 60 mg    predniSONE (DELTASONE) 20 MG Tab     Acute gout flare.  No signs of infection including red streaking or constitutional symptoms.  Atraumatic.    Toradol and prednisone has worked in the past.  Dietary changes  Restart allopurinol after flare    Return to clinic or go to ED if symptoms worsen or persist. Indications for ED discussed at length. Patient voices understanding. Follow-up with your primary care provider in 3-5 days. Red flags discussed. All side " effects of medication discussed including allergic response, GI upset, tendon injury, etc.    Please note that this dictation was created using voice recognition software. I have made every reasonable attempt to correct obvious errors, but I expect that there are errors of grammar and possibly content that I did not discover before finalizing the note.

## 2020-08-21 ENCOUNTER — OFFICE VISIT (OUTPATIENT)
Dept: URGENT CARE | Facility: PHYSICIAN GROUP | Age: 40
End: 2020-08-21
Payer: COMMERCIAL

## 2020-08-21 VITALS
WEIGHT: 241 LBS | DIASTOLIC BLOOD PRESSURE: 76 MMHG | OXYGEN SATURATION: 98 % | TEMPERATURE: 98.1 F | HEIGHT: 73 IN | RESPIRATION RATE: 16 BRPM | HEART RATE: 78 BPM | SYSTOLIC BLOOD PRESSURE: 108 MMHG | BODY MASS INDEX: 31.94 KG/M2

## 2020-08-21 DIAGNOSIS — M10.9 ACUTE GOUT OF RIGHT KNEE, UNSPECIFIED CAUSE: ICD-10-CM

## 2020-08-21 PROCEDURE — 99214 OFFICE O/P EST MOD 30 MIN: CPT | Performed by: PHYSICIAN ASSISTANT

## 2020-08-21 RX ORDER — KETOROLAC TROMETHAMINE 30 MG/ML
60 INJECTION, SOLUTION INTRAMUSCULAR; INTRAVENOUS ONCE
Status: COMPLETED | OUTPATIENT
Start: 2020-08-21 | End: 2020-08-21

## 2020-08-21 RX ORDER — PREDNISONE 20 MG/1
TABLET ORAL
Qty: 10 TAB | Refills: 1 | Status: SHIPPED | OUTPATIENT
Start: 2020-08-21 | End: 2021-08-31

## 2020-08-21 RX ORDER — INDOMETHACIN 50 MG/1
50 CAPSULE ORAL DAILY
Qty: 30 CAP | Refills: 1 | Status: SHIPPED | OUTPATIENT
Start: 2020-08-21 | End: 2021-08-31

## 2020-08-21 RX ADMIN — KETOROLAC TROMETHAMINE 60 MG: 30 INJECTION, SOLUTION INTRAMUSCULAR; INTRAVENOUS at 09:48

## 2020-08-21 NOTE — PROGRESS NOTES
Chief Complaint   Patient presents with   • Gout     right knee       HISTORY OF PRESENT ILLNESS: Patient is a 39 y.o. male who presents today for the following:    Right knee pain x several days  + STS, pain  H/o gout, frequent flares  Has been on allopurinol in the past, seems to cause flares  Hasn't seen PCP in a while  Has been seen multiple times in the urgent care over the last 6 months for the same  Prednisone and Toradol injection seems to work the best    Patient Active Problem List    Diagnosis Date Noted   • Obesity (BMI 30-39.9) 08/22/2017       Allergies:Patient has no known allergies.    Current Outpatient Medications Ordered in Epic   Medication Sig Dispense Refill   • predniSONE (DELTASONE) 20 MG Tab Take 2 tabs PO QD x 5 days 10 Tab 1   • indomethacin (INDOCIN) 50 MG Cap Take 1 Cap by mouth every day. 30 Cap 1     Current Facility-Administered Medications Ordered in Epic   Medication Dose Route Frequency Provider Last Rate Last Dose   • ketorolac (TORADOL) injection 60 mg  60 mg Intramuscular Once CHEVY Winston.A.-CELINE           Past Medical History:   Diagnosis Date   • Gout, unspecified        Social History     Tobacco Use   • Smoking status: Former Smoker   • Smokeless tobacco: Never Used   • Tobacco comment: vape   Substance Use Topics   • Alcohol use: No     Alcohol/week: 0.0 oz     Comment: 3 PER MONTH   • Drug use: No       No family status information on file.   History reviewed. No pertinent family history.    Review of Systems:   Constitutional ROS: No unexpected change in weight, No weakness, No fatigue  Pulmonary ROS: No chronic cough, sputum, or hemoptysis, No dyspnea on exertion, No wheezing  Cardiovascular ROS: No diaphoresis, No edema, No palpitations  Musculoskeletal/Extremities ROS: Right knee pain  Hematologic/Lymphatic ROS: No chills, No night sweats, No weight loss  Skin/Integumentary ROS: No edema, No evidence of rash, No itching      Exam:  /76   Pulse 78   Temp  "36.7 °C (98.1 °F) (Temporal)   Resp 16   Ht 1.854 m (6' 1\")   Wt 109.3 kg (241 lb)   SpO2 98%   General: Well developed, well nourished. No distress.    HENT: Head is grossly normal.  Pulmonary: Unlabored respiratory effort.   Neurologic: Grossly nonfocal. No facial asymmetry noted.  Musculoskeletal: Diffuse edema noted in the right knee with associated tenderness.  Decreased range of motion due to pain.  Skin: Warm, dry, good turgor. No rashes in visible areas.   Psych: Normal mood. Alert and oriented to person, place and time.    Toradol 60 mg IM    Assessment/Plan:  Had a long discussion with the patient regarding allopurinol.  Patient will start with prednisone.  When his pain is completely resolved he will restart allopurinol with daily indomethacin and follow-up with his primary care provider.  Follow up for worsening or persistent symptoms.  1. Acute gout of right knee, unspecified cause  ketorolac (TORADOL) injection 60 mg    indomethacin (INDOCIN) 50 MG Cap       "

## 2020-10-18 ENCOUNTER — APPOINTMENT (OUTPATIENT)
Dept: URGENT CARE | Facility: PHYSICIAN GROUP | Age: 40
End: 2020-10-18
Payer: COMMERCIAL

## 2021-08-31 ENCOUNTER — OFFICE VISIT (OUTPATIENT)
Dept: URGENT CARE | Facility: PHYSICIAN GROUP | Age: 41
End: 2021-08-31
Payer: COMMERCIAL

## 2021-08-31 VITALS
DIASTOLIC BLOOD PRESSURE: 68 MMHG | RESPIRATION RATE: 14 BRPM | SYSTOLIC BLOOD PRESSURE: 144 MMHG | BODY MASS INDEX: 31.01 KG/M2 | OXYGEN SATURATION: 100 % | HEART RATE: 97 BPM | WEIGHT: 234 LBS | HEIGHT: 73 IN | TEMPERATURE: 97.9 F

## 2021-08-31 DIAGNOSIS — M10.021 ACUTE IDIOPATHIC GOUT OF RIGHT ELBOW: ICD-10-CM

## 2021-08-31 PROCEDURE — 99214 OFFICE O/P EST MOD 30 MIN: CPT | Performed by: FAMILY MEDICINE

## 2021-08-31 RX ORDER — METHYLPREDNISOLONE 4 MG/1
TABLET ORAL
Qty: 21 TABLET | Refills: 0 | Status: SHIPPED | OUTPATIENT
Start: 2021-08-31 | End: 2021-09-06

## 2021-08-31 RX ORDER — INDOMETHACIN 50 MG/1
50 CAPSULE ORAL 3 TIMES DAILY PRN
Qty: 30 CAPSULE | Refills: 2 | Status: SHIPPED | OUTPATIENT
Start: 2021-08-31 | End: 2022-07-02

## 2021-08-31 ASSESSMENT — PAIN SCALES - GENERAL: PAINLEVEL: 7=MODERATE-SEVERE PAIN

## 2021-08-31 NOTE — PROGRESS NOTES
Chief Complaint:    Chief Complaint   Patient presents with   • Elbow Pain     right, warm to the touch, gout pt states       History of Present Illness:    Symptoms started yesterday. He has pain and swelling in right elbow he feels is due to gout - he feels was due to drinking hard ETOH this past weekend. Toradol IM usually helps, but we are out of this today. He feels Medrol Dose North should help. Indomethacin has also helped in the past.      Past Medical History:    Past Medical History:   Diagnosis Date   • Gout, unspecified      Past Surgical History:    Past Surgical History:   Procedure Laterality Date   • ANKLE ARTHROSCOPY  5/19/2010    Performed by IRMA MELISSA at SURGERY SAME DAY [facility] ORS   • OTHER ABDOMINAL SURGERY  1997    COLOSTOMY AND REVERSAL   • OTHER ABDOMINAL SURGERY      spleenectomy 1997     Social History:    Social History     Socioeconomic History   • Marital status:      Spouse name: Not on file   • Number of children: Not on file   • Years of education: Not on file   • Highest education level: Not on file   Occupational History   • Not on file   Tobacco Use   • Smoking status: Current Every Day Smoker     Types: Cigarettes   • Smokeless tobacco: Never Used   Vaping Use   • Vaping Use: Former   Substance and Sexual Activity   • Alcohol use: Yes     Alcohol/week: 0.0 oz     Comment: 3 PER MONTH   • Drug use: No   • Sexual activity: Not on file   Other Topics Concern   • Not on file   Social History Narrative   • Not on file     Social Determinants of Health     Financial Resource Strain:    • Difficulty of Paying Living Expenses:    Food Insecurity:    • Worried About Running Out of Food in the Last Year:    • Ran Out of Food in the Last Year:    Transportation Needs:    • Lack of Transportation (Medical):    • Lack of Transportation (Non-Medical):    Physical Activity:    • Days of Exercise per Week:    • Minutes of Exercise per Session:    Stress:    • Feeling of Stress :   "  Social Connections:    • Frequency of Communication with Friends and Family:    • Frequency of Social Gatherings with Friends and Family:    • Attends Alevism Services:    • Active Member of Clubs or Organizations:    • Attends Club or Organization Meetings:    • Marital Status:    Intimate Partner Violence:    • Fear of Current or Ex-Partner:    • Emotionally Abused:    • Physically Abused:    • Sexually Abused:      Family History:    No family history on file.    Medications:    No current outpatient medications on file prior to visit.     No current facility-administered medications on file prior to visit.     Allergies:    No Known Allergies      Vitals:    Vitals:    08/31/21 1309   BP: 144/68   Pulse: 97   Resp: 14   Temp: 36.6 °C (97.9 °F)   SpO2: 100%   Weight: 106 kg (234 lb)   Height: 1.854 m (6' 1\")       Physical Exam:    Constitutional: Vital signs reviewed. Appears well-developed and well-nourished. No acute distress.   Eyes: Sclera white, conjunctivae clear.  ENT: External ears normal. Hearing normal.  Cardiovascular: Peripheral pulses 2+.   Pulmonary/Chest: Respirations non-labored.  Musculoskeletal: Right elbow has diffuse soft tissue swelling, tenderness to palpation, and associated decreased range of motion.  Neurological: Alert and oriented to person, place, and time. Muscle tone normal. Coordination normal. Light touch and sensation normal.  Skin: No rashes or lesions. Warm, dry, normal turgor.  Psychiatric: Normal mood and affect. Behavior is normal. Judgment and thought content normal.       Assessment / Plan:    1. Acute idiopathic gout of right elbow  - methylPREDNISolone (MEDROL DOSEPAK) 4 MG Tablet Therapy Pack; TAKE AS DIRECTED ON PACKAGE.  Dispense: 21 Tablet; Refill: 0  - indomethacin (INDOCIN) 50 MG Cap; Take 1 Capsule by mouth 3 times a day as needed (Gout attack).  Dispense: 30 Capsule; Refill: 2      Discussed with him DDX, management options, and risks, benefits, and " alternatives to treatment plan agreed upon.    Symptoms started yesterday. He has pain and swelling in right elbow he feels is due to gout - he feels was due to drinking hard ETOH this past weekend. Toradol IM usually helps, but we are out of this today. He feels Medrol Dose North should help. Indomethacin has also helped in the past.    Chronic condition with acute exacerbation.    Agreeable to medications prescribed.    Discussed expected course of duration, time for improvement, and to seek follow-up in Emergency Room, urgent care, or with PCP if getting worse at any time or not improving within expected time frame.

## 2021-09-14 ENCOUNTER — OFFICE VISIT (OUTPATIENT)
Dept: URGENT CARE | Facility: PHYSICIAN GROUP | Age: 41
End: 2021-09-14
Payer: COMMERCIAL

## 2021-09-14 VITALS
OXYGEN SATURATION: 96 % | WEIGHT: 246 LBS | HEIGHT: 73 IN | TEMPERATURE: 99 F | DIASTOLIC BLOOD PRESSURE: 66 MMHG | HEART RATE: 102 BPM | RESPIRATION RATE: 16 BRPM | BODY MASS INDEX: 32.6 KG/M2 | SYSTOLIC BLOOD PRESSURE: 102 MMHG

## 2021-09-14 DIAGNOSIS — M10.472 OTHER SECONDARY ACUTE GOUT OF LEFT FOOT: ICD-10-CM

## 2021-09-14 PROCEDURE — 99213 OFFICE O/P EST LOW 20 MIN: CPT | Performed by: PHYSICIAN ASSISTANT

## 2021-09-14 RX ORDER — KETOROLAC TROMETHAMINE 30 MG/ML
30 INJECTION, SOLUTION INTRAMUSCULAR; INTRAVENOUS ONCE
Status: COMPLETED | OUTPATIENT
Start: 2021-09-14 | End: 2021-09-14

## 2021-09-14 RX ORDER — KETOROLAC TROMETHAMINE 10 MG/1
10 TABLET, FILM COATED ORAL 2 TIMES DAILY PRN
Qty: 4 TABLET | Refills: 0 | Status: SHIPPED | OUTPATIENT
Start: 2021-09-14 | End: 2021-09-16

## 2021-09-14 RX ADMIN — KETOROLAC TROMETHAMINE 30 MG: 30 INJECTION, SOLUTION INTRAMUSCULAR; INTRAVENOUS at 13:42

## 2021-09-14 ASSESSMENT — ENCOUNTER SYMPTOMS: JOINT SWELLING: 1

## 2021-09-14 NOTE — PROGRESS NOTES
"Subjective     hCase Hughes is a 40 y.o. male who presents with Gout (L leg flare up )            Patient is a 40-year-old male who presents to urgent care with suspected gout flare in his left foot.  Patient has a long history of gout of which he does report his trigger is tequila of which she is 3 days clean.  This is been a significant problem over the last few years as it usually alcohol or dehydration that triggers his gout.  He does report allopurinol in the past has usually triggered a gout flare.  He reports recently the indomethacin has caused him to feel slightly dizzy.  He is requesting a Toradol injection and refill on a steroid pack.  Patient was previously evaluated 3 weeks ago and again 2 weeks ago of which she was written a steroid pack along with indomethacin.  He denies any new trauma, fall or noted injury.    Foot Problem  This is a new problem. The current episode started yesterday. The problem occurs constantly. The problem has been gradually worsening. Associated symptoms include joint swelling. Exacerbated by: Walking, pressure over the foot.  Treatments tried: As above.       Review of Systems   Musculoskeletal: Positive for joint pain and joint swelling.   All other systems reviewed and are negative.             Objective     /66   Pulse (!) 102   Temp 37.2 °C (99 °F) (Temporal)   Resp 16   Ht 1.854 m (6' 1\")   Wt 112 kg (246 lb)   SpO2 96%   BMI 32.46 kg/m²      Physical Exam  Vitals reviewed.   Constitutional:       General: He is not in acute distress.     Appearance: He is well-developed.   HENT:      Head: Normocephalic and atraumatic.   Eyes:      Conjunctiva/sclera: Conjunctivae normal.      Pupils: Pupils are equal, round, and reactive to light.   Neck:      Trachea: No tracheal deviation.   Cardiovascular:      Rate and Rhythm: Normal rate.   Pulmonary:      Effort: Pulmonary effort is normal.   Musculoskeletal:         General: Swelling and tenderness present.      " Cervical back: Normal range of motion and neck supple.        Legs:       Comments: Faint erythema to the dorsal aspect of the left foot-midfoot.  Diffuse swelling.  Diffusely tender.   Skin:     General: Skin is warm.      Findings: No rash.      Comments: No rash to area exposed during the visit today.    Neurological:      Mental Status: He is alert and oriented to person, place, and time.      Coordination: Coordination normal.   Psychiatric:         Behavior: Behavior normal.         Thought Content: Thought content normal.         Judgment: Judgment normal.                             Assessment & Plan        1. Other secondary acute gout of left foot  - ketorolac (TORADOL) injection 30 mg  - ketorolac (TORADOL) 10 MG Tab; Take 1 Tablet by mouth 2 times a day as needed for up to 2 days.  Dispense: 4 Tablet; Refill: 0          Unfortunately patient has been on 2 steroid packs in the last 3 weeks.-He notes that indomethacin does not make a difference and causes upset stomach with slight dizziness.  Will write for the above and will provide 2 days of Toradol-he is to avoid other NSAIDs and increase fluids.  He is also to continue to avoid alcohol of which this was celebrated today.  Appropriate PPE worn at all times by provider.   Pt. Had face mask on throughout entirety of the visit other than oropharyngeal examination today.     Side effects of OTC or prescribed medications discussed.     DDX, Supportive care, and indications for immediate follow-up discussed with patient.    Instructed to return to clinic or nearest emergency department if we are not available for any change in condition, further concerns, or worsening of symptoms.    The patient and/or guardian demonstrated a good understanding and agreed with the treatment plan.    Please note that this dictation was created using voice recognition software. I have made every reasonable attempt to correct obvious errors, but I expect that there are errors of  grammar and possibly content that I did not discover before finalizing the note.

## 2021-09-15 ENCOUNTER — NURSE TRIAGE (OUTPATIENT)
Dept: MEDICAL GROUP | Facility: PHYSICIAN GROUP | Age: 41
End: 2021-09-15

## 2021-09-15 NOTE — TELEPHONE ENCOUNTER
"Phone Number Called: [phone]      Call outcome: Spoke with patient's significant other and mother regarding information below.    Message: Received call from patient's significant other, Cee, with concerns of patient's worsening condition.   Cee states that Chase was seen and treated in the Urgent Care yesterday for gout flare-up.  Patient was given an injection of Toradol.  Patient continued to have severe pain throughout the night and was not able to sleep.  Patient is currently sleeping but Cee was also concerned with increased swelling of the upper and lower extremities.  She denied difficulty breathing or any other symptoms.  With regard with the increased swelling and pain, advised Cee to take the patient to the Emergency Department for further evaluation.  Cee was agreeable to plan.    Reason for Disposition  • Can't walk or can barely stand (new onset)    Additional Information  • Negative: Sounds like a life-threatening emergency to the triager  • Negative: Chest pain  • Negative: Small area of swelling and followed an insect bite to the area  • Negative: Followed a knee injury  • Negative: Ankle or foot injury  • Negative: Pregnant with leg swelling or edema  • Negative: Difficulty breathing at rest  • Negative: Entire foot is cool or blue in comparison to other side    Answer Assessment - Initial Assessment Questions  1. ONSET: \"When did the swelling start?\" (e.g., minutes, hours, days)      A few days ago, was seen yesterday in Urgent Care for this.  2. LOCATION: \"What part of the leg is swollen?\"  \"Are both legs swollen or just one leg?\"      Both legs and arms  3. SEVERITY: \"How bad is the swelling?\" (e.g., localized; mild, moderate, severe)   - Localized - small area of swelling localized to one leg   - MILD pedal edema - swelling limited to foot and ankle, pitting edema < 1/4 inch (6 mm) deep, rest and elevation eliminate most or all swelling   - MODERATE edema - swelling of " "lower leg to knee, pitting edema > 1/4 inch (6 mm) deep, rest and elevation only partially reduce swelling   - SEVERE edema - swelling extends above knee, facial or hand swelling present       Increased from yesterday  4. REDNESS: \"Does the swelling look red or infected?\"      N/A  5. PAIN: \"Is the swelling painful to touch?\" If so, ask: \"How painful is it?\"   (Scale 1-10; mild, moderate or severe)      Very painful, patient currently asleep  6. FEVER: \"Do you have a fever?\" If so, ask: \"What is it, how was it measured, and when did it start?\"       N/A  7. CAUSE: \"What do you think is causing the leg swelling?\"      Gout flare-up  8. MEDICAL HISTORY: \"Do you have a history of heart failure, kidney disease, liver failure, or cancer?\"      N/a  9. RECURRENT SYMPTOM: \"Have you had leg swelling before?\" If so, ask: \"When was the last time?\" \"What happened that time?\"      Patient has chronic gout  10. OTHER SYMPTOMS: \"Do you have any other symptoms?\" (e.g., chest pain, difficulty breathing)        No  11. PREGNANCY: \"Is there any chance you are pregnant?\" \"When was your last menstrual period?\"        N/A    Protocols used: LEG SWELLING AND EDEMA-A-OH      "

## 2022-06-15 ENCOUNTER — OFFICE VISIT (OUTPATIENT)
Dept: URGENT CARE | Facility: PHYSICIAN GROUP | Age: 42
End: 2022-06-15
Payer: COMMERCIAL

## 2022-06-15 VITALS
BODY MASS INDEX: 18.82 KG/M2 | HEART RATE: 87 BPM | DIASTOLIC BLOOD PRESSURE: 70 MMHG | HEIGHT: 73 IN | RESPIRATION RATE: 17 BRPM | TEMPERATURE: 97.9 F | WEIGHT: 142 LBS | OXYGEN SATURATION: 97 % | SYSTOLIC BLOOD PRESSURE: 118 MMHG

## 2022-06-15 DIAGNOSIS — H66.93 BILATERAL OTITIS MEDIA, UNSPECIFIED OTITIS MEDIA TYPE: ICD-10-CM

## 2022-06-15 PROCEDURE — 99213 OFFICE O/P EST LOW 20 MIN: CPT | Performed by: PHYSICIAN ASSISTANT

## 2022-06-15 RX ORDER — KETOROLAC TROMETHAMINE 30 MG/ML
30 INJECTION, SOLUTION INTRAMUSCULAR; INTRAVENOUS ONCE
Status: COMPLETED | OUTPATIENT
Start: 2022-06-15 | End: 2022-06-15

## 2022-06-15 RX ORDER — AMOXICILLIN AND CLAVULANATE POTASSIUM 875; 125 MG/1; MG/1
1 TABLET, FILM COATED ORAL 2 TIMES DAILY
Qty: 20 TABLET | Refills: 0 | Status: SHIPPED | OUTPATIENT
Start: 2022-06-15 | End: 2022-06-25

## 2022-06-15 RX ADMIN — KETOROLAC TROMETHAMINE 30 MG: 30 INJECTION, SOLUTION INTRAMUSCULAR; INTRAVENOUS at 09:25

## 2022-06-15 ASSESSMENT — ENCOUNTER SYMPTOMS
SHORTNESS OF BREATH: 0
EYE REDNESS: 0
DIZZINESS: 0
NAUSEA: 0
FEVER: 1
SORE THROAT: 0
COUGH: 0
DIARRHEA: 0
SINUS PAIN: 0
VOMITING: 0
HEADACHES: 1
CHILLS: 0
ABDOMINAL PAIN: 0
EYE PAIN: 0
CONSTIPATION: 0
DIAPHORESIS: 0
EYE DISCHARGE: 0
WHEEZING: 0

## 2022-06-15 NOTE — PROGRESS NOTES
"  Subjective:     Chase Hughes  is a 41 y.o. male who presents for Ear Pain (Bilat ear px x 3 days)       He presents with bilateral ear pain that has been ongoing for the last 3 days.  Left ear is worse than the right.  He has felt fevers since symptom onset.  He denies sinus congestion, chest pain, shortness of breath, abdominal pain, nausea/vomiting.  Denies any recent close sick contacts.  Denies any drainage of the ears, denies dental pain, denies facial swelling, denies eye pain.       Review of Systems   Constitutional: Positive for fever. Negative for chills, diaphoresis and malaise/fatigue.   HENT: Positive for ear pain. Negative for congestion, ear discharge, sinus pain and sore throat.    Eyes: Negative for pain, discharge and redness.   Respiratory: Negative for cough, shortness of breath and wheezing.    Cardiovascular: Negative for chest pain.   Gastrointestinal: Negative for abdominal pain, constipation, diarrhea, nausea and vomiting.   Neurological: Positive for headaches. Negative for dizziness.      No Known Allergies  Past Medical History:   Diagnosis Date   • Gout, unspecified         Objective:   /70 (BP Location: Left arm, Patient Position: Sitting, BP Cuff Size: Large adult)   Pulse 87   Temp 36.6 °C (97.9 °F) (Temporal)   Resp 17   Ht 1.854 m (6' 1\")   Wt 64.4 kg (142 lb)   SpO2 97%   BMI 18.73 kg/m²   Physical Exam  Vitals and nursing note reviewed.   Constitutional:       General: He is not in acute distress.     Appearance: Normal appearance. He is not ill-appearing, toxic-appearing or diaphoretic.   HENT:      Head: Normocephalic.      Right Ear: There is no impacted cerumen.      Left Ear: There is no impacted cerumen.      Ears:      Comments: Bilateral TMs are bulging, there is increased fluid within bilateral middle ears.     Nose: Congestion and rhinorrhea present.      Mouth/Throat:      Mouth: Mucous membranes are moist.      Pharynx: No oropharyngeal exudate or " posterior oropharyngeal erythema.   Eyes:      General:         Right eye: No discharge.         Left eye: No discharge.      Conjunctiva/sclera: Conjunctivae normal.   Cardiovascular:      Rate and Rhythm: Normal rate and regular rhythm.   Pulmonary:      Effort: Pulmonary effort is normal. No respiratory distress.      Breath sounds: Normal breath sounds. No stridor. No wheezing or rhonchi.   Musculoskeletal:      Cervical back: Neck supple.   Lymphadenopathy:      Cervical: No cervical adenopathy.   Neurological:      General: No focal deficit present.      Mental Status: He is alert and oriented to person, place, and time.   Psychiatric:         Mood and Affect: Mood normal.         Behavior: Behavior normal.         Thought Content: Thought content normal.         Judgment: Judgment normal.             Diagnostic testing: None    Assessment/Plan:     Encounter Diagnoses   Name Primary?   • Bilateral otitis media, unspecified otitis media type         Plan for care for today's complaint includes Augmentin, Toradol 30 mg injection in office.  He should continue to monitor symptoms, if they remain ongoing for the next 3-5 days or get significantly worse he should return to the urgent care for reevaluation.  Can also take over-the-counter anti-inflammatory medications or Tylenol for pain relief.  Over-the-counter decongestants may also benefit..  Prescription for Augmentin provided.    See AVS Instructions below for written guidance provided to patient on after-visit management and care in addition to our verbal discussion during the visit.    Please note that this dictation was created using voice recognition software. I have attempted to correct all errors, but there may be sound-alike, spelling, grammar and possibly content errors that I did not discover before finalizing the note.    Castro Mak PA-C

## 2022-07-02 ENCOUNTER — OFFICE VISIT (OUTPATIENT)
Dept: URGENT CARE | Facility: PHYSICIAN GROUP | Age: 42
End: 2022-07-02
Payer: COMMERCIAL

## 2022-07-02 VITALS
DIASTOLIC BLOOD PRESSURE: 86 MMHG | TEMPERATURE: 96.4 F | SYSTOLIC BLOOD PRESSURE: 162 MMHG | WEIGHT: 222 LBS | OXYGEN SATURATION: 96 % | HEIGHT: 73 IN | RESPIRATION RATE: 20 BRPM | BODY MASS INDEX: 29.42 KG/M2 | HEART RATE: 94 BPM

## 2022-07-02 DIAGNOSIS — M10.9 ACUTE GOUT OF LEFT WRIST, UNSPECIFIED CAUSE: ICD-10-CM

## 2022-07-02 DIAGNOSIS — R03.0 ELEVATED BLOOD PRESSURE READING: ICD-10-CM

## 2022-07-02 DIAGNOSIS — M25.532 LEFT WRIST PAIN: ICD-10-CM

## 2022-07-02 DIAGNOSIS — Z87.39 HISTORY OF GOUT: ICD-10-CM

## 2022-07-02 PROCEDURE — 99214 OFFICE O/P EST MOD 30 MIN: CPT | Performed by: NURSE PRACTITIONER

## 2022-07-02 RX ORDER — METHYLPREDNISOLONE 4 MG/1
TABLET ORAL
Qty: 21 TABLET | Refills: 0 | Status: SHIPPED | OUTPATIENT
Start: 2022-07-02

## 2022-07-02 NOTE — PROGRESS NOTES
Chase Hughes is a 41 y.o. male who presents for Gout (Wrist pain x 1 day)      HPI Chase is a 41-year-old male who presents with extreme gout pain to his left wrist.  He has a history of gout.  He reports he has had a gout attack in about 6 months now.  Pain started yesterday.  It was deep in the joint and has radiated out.  His left wrist is now red and hot to touch.  Even the slightest pressure hurts his wrist.  He has taken some Tylenol and a couple of ibuprofen.  Nothing has alleviated his discomfort.  He reports his pain is sometimes 10/10.  He has used preventative gout medications in the past but it always makes his gout worse.  He reports that methylprednisolone packs have helped him the best.  No other aggravating alleviating factors.  He denies alcohol use or heavy shellfish use.  He tries to keep his diet as clean as possible to prevent gout attacks.    ROS    Allergies:     No Known Allergies    PMSFS Hx:  Past Medical History:   Diagnosis Date   • Gout, unspecified      Past Surgical History:   Procedure Laterality Date   • ANKLE ARTHROSCOPY  2010    Performed by IRMA MELISSA at SURGERY SAME DAY [facility]   • OTHER ABDOMINAL SURGERY      COLOSTOMY AND REVERSAL   • OTHER ABDOMINAL SURGERY      spleenectomy      History reviewed. No pertinent family history.  Social History     Tobacco Use   • Smoking status: Former Smoker     Types: Cigarettes     Quit date: 2021     Years since quittin.6   • Smokeless tobacco: Never Used   Substance Use Topics   • Alcohol use: Not Currently     Alcohol/week: 0.0 oz     Comment: 3 PER MONTH       Problems:   Patient Active Problem List   Diagnosis   • Obesity (BMI 30-39.9)       Medications:   No current outpatient medications on file prior to visit.     No current facility-administered medications on file prior to visit.          Objective:     BP (!) 162/86   Pulse 94   Temp (!) 35.8 °C (96.4 °F) (Temporal)   Resp 20   Ht 1.854 m (6'  "1\")   Wt 101 kg (222 lb)   SpO2 96%   BMI 29.29 kg/m²     Physical Exam  Vitals reviewed.   Constitutional:       Appearance: Normal appearance. He is normal weight.   Cardiovascular:      Rate and Rhythm: Normal rate.      Pulses: Normal pulses.   Pulmonary:      Effort: Pulmonary effort is normal.   Musculoskeletal:      Left wrist: Swelling and tenderness present. Decreased range of motion.        Arms:    Skin:     General: Skin is warm.      Capillary Refill: Capillary refill takes less than 2 seconds.   Neurological:      Mental Status: He is alert and oriented to person, place, and time.   Psychiatric:         Mood and Affect: Mood normal.         Behavior: Behavior normal.         Thought Content: Thought content normal.           Assessment /Associated Orders:      1. Acute gout of left wrist, unspecified cause  methylPREDNISolone (MEDROL DOSEPAK) 4 MG Tablet Therapy Pack   2. History of gout     3. Left wrist pain     4. Elevated blood pressure reading         Medical Decision Making:    Pt is clinically stable at today's acute urgent care visit.  No acute distress noted. Appropriate for outpatient management at this time.   Acute problem today .   Educated in proper administration of medication(s) ordered today including safety, possible SE, risks, benefits, rationale and alternatives to therapy.   Do not take additional NSAIDS or steroids while taking RX medication. Educated on risk of lower immunity in short term, weight changes, mood changes, increased serum glucose and elevated BP while taking steroids.   Warm/ cold compress prn pain   Keep well hydrated  Educated that losing weight can sometimes help relieve or prevent further episodes of gout. Dietary changes can also help prevent future attacks.   Cut down on:Red meat and seafood, Alcohol, such as beer, wine, and hard alcohol Foods and drinks that have high-fructose corn syrup (that includes most sodas and store-bought cakes and cookies)   Eat " nutrient dense foods such as: Low-fat dairy products, such as low-fat milk, cheese, and yogurt, Whole grains and vegetables    Pt informed that his bp is elevated today. He believes that is due to his pain. No hx of HTN in the past. Referred to  primary care provider for monitoring and management. Educated in end organ effects of uncontrolled BP  including MI, CVA, Blindness, CRF and death. Educated in TLC's.  Advised close monitoring.       • Discussed DDx, management options (risks,benefits, and alternatives to planned treatment), natural progression and supportive care.  Expressed understanding and the treatment plan was agreed upon. Questions were encouraged and answered   • Return to urgent care prn if new or worsening sx or if there is no improvement in condition prn.    • Educated in Red flags and indications to immediately call 911 or present to the Emergency Department.     I personally reviewed prior external notes and test results pertinent to today's visit.  I have independently reviewed and interpreted all diagnostics ordered during this urgent care acute visit.   Time spent evaluating this patient for his acute medical concern today was 32 minutes. This time includes preparing for visit, counseling/education, exam and evaluation, obtaining history, independent interpretation, ordering lab/test/procedures, medication management and documentation as indicated above.Time does not include separately billable procedures noted .       Please note that this dictation was created using voice recognition software. I have worked with consultants from the vendor as well as technical experts from [facility] to optimize the interface. I have made every reasonable attempt to correct obvious errors, but I expect that there are errors of grammar and possibly content that I did not discover before finalizing the note.  This note was electronically signed by provider